# Patient Record
Sex: FEMALE | URBAN - METROPOLITAN AREA
[De-identification: names, ages, dates, MRNs, and addresses within clinical notes are randomized per-mention and may not be internally consistent; named-entity substitution may affect disease eponyms.]

---

## 2022-10-14 ENCOUNTER — HOSPITAL ENCOUNTER (INPATIENT)
Facility: HOSPITAL | Age: 37
LOS: 4 days | Discharge: HOME | DRG: 446 | End: 2022-10-18
Attending: INTERNAL MEDICINE | Admitting: INTERNAL MEDICINE
Payer: MEDICAID

## 2022-10-14 DIAGNOSIS — K80.50 CHOLEDOCHOLITHIASIS: ICD-10-CM

## 2022-10-14 PROCEDURE — 11000001 HC ACUTE MED/SURG PRIVATE ROOM

## 2022-10-14 PROCEDURE — 25000003 PHARM REV CODE 250: Performed by: HOSPITALIST

## 2022-10-14 PROCEDURE — 99223 1ST HOSP IP/OBS HIGH 75: CPT | Mod: ,,, | Performed by: HOSPITALIST

## 2022-10-14 PROCEDURE — 99223 PR INITIAL HOSPITAL CARE,LEVL III: ICD-10-PCS | Mod: ,,, | Performed by: HOSPITALIST

## 2022-10-14 PROCEDURE — 63600175 PHARM REV CODE 636 W HCPCS: Performed by: HOSPITALIST

## 2022-10-14 RX ORDER — ACETAMINOPHEN 325 MG/1
650 TABLET ORAL EVERY 4 HOURS PRN
Status: DISCONTINUED | OUTPATIENT
Start: 2022-10-14 | End: 2022-10-18 | Stop reason: HOSPADM

## 2022-10-14 RX ORDER — TALC
6 POWDER (GRAM) TOPICAL NIGHTLY PRN
Status: DISCONTINUED | OUTPATIENT
Start: 2022-10-14 | End: 2022-10-18 | Stop reason: HOSPADM

## 2022-10-14 RX ORDER — NALOXONE HCL 0.4 MG/ML
0.02 VIAL (ML) INJECTION
Status: DISCONTINUED | OUTPATIENT
Start: 2022-10-14 | End: 2022-10-18 | Stop reason: HOSPADM

## 2022-10-14 RX ORDER — IPRATROPIUM BROMIDE AND ALBUTEROL SULFATE 2.5; .5 MG/3ML; MG/3ML
3 SOLUTION RESPIRATORY (INHALATION) EVERY 6 HOURS PRN
Status: DISCONTINUED | OUTPATIENT
Start: 2022-10-14 | End: 2022-10-18 | Stop reason: HOSPADM

## 2022-10-14 RX ORDER — PROCHLORPERAZINE EDISYLATE 5 MG/ML
5 INJECTION INTRAMUSCULAR; INTRAVENOUS EVERY 6 HOURS PRN
Status: DISCONTINUED | OUTPATIENT
Start: 2022-10-14 | End: 2022-10-18 | Stop reason: HOSPADM

## 2022-10-14 RX ORDER — IBUPROFEN 200 MG
24 TABLET ORAL
Status: DISCONTINUED | OUTPATIENT
Start: 2022-10-14 | End: 2022-10-18 | Stop reason: HOSPADM

## 2022-10-14 RX ORDER — SODIUM CHLORIDE 9 MG/ML
INJECTION, SOLUTION INTRAVENOUS CONTINUOUS
Status: ACTIVE | OUTPATIENT
Start: 2022-10-14 | End: 2022-10-15

## 2022-10-14 RX ORDER — OXYCODONE HYDROCHLORIDE 5 MG/1
5 TABLET ORAL
Status: DISCONTINUED | OUTPATIENT
Start: 2022-10-14 | End: 2022-10-15

## 2022-10-14 RX ORDER — ONDANSETRON 2 MG/ML
4 INJECTION INTRAMUSCULAR; INTRAVENOUS EVERY 8 HOURS PRN
Status: DISCONTINUED | OUTPATIENT
Start: 2022-10-14 | End: 2022-10-18 | Stop reason: HOSPADM

## 2022-10-14 RX ORDER — IBUPROFEN 200 MG
16 TABLET ORAL
Status: DISCONTINUED | OUTPATIENT
Start: 2022-10-14 | End: 2022-10-18 | Stop reason: HOSPADM

## 2022-10-14 RX ORDER — SODIUM CHLORIDE 0.9 % (FLUSH) 0.9 %
10 SYRINGE (ML) INJECTION
Status: DISCONTINUED | OUTPATIENT
Start: 2022-10-14 | End: 2022-10-18 | Stop reason: HOSPADM

## 2022-10-14 RX ADMIN — Medication 6 MG: at 10:10

## 2022-10-14 RX ADMIN — OXYCODONE 5 MG: 5 TABLET ORAL at 10:10

## 2022-10-14 RX ADMIN — ONDANSETRON 4 MG: 2 INJECTION INTRAMUSCULAR; INTRAVENOUS at 08:10

## 2022-10-14 RX ADMIN — ACETAMINOPHEN 650 MG: 325 TABLET ORAL at 08:10

## 2022-10-14 RX ADMIN — SODIUM CHLORIDE: 0.9 INJECTION, SOLUTION INTRAVENOUS at 11:10

## 2022-10-14 NOTE — PROVIDER TRANSFER
Outside Transfer Acceptance Note / Regional Referral Center    Referring facility: [facility]   Referring provider: BLAIR CARVAJAL  Accepting facility: [facility]  Accepting provider: SEBASTIÁN DE ANDA  Reason for transfer: Higher Level of Care   Transfer diagnosis: Choledocholithiasis  Transfer specialty requested: Pancreatic Billiary  Transfer specialty notified: yes  Transfer level: NUMBER 1-5: 2  Bed type requested: med-surg  Isolation status: No active isolations   Admission class or status: IP- Inpatient      Narrative     37-year-old female with a history of prior cholecystectomy, endometriosis, and rectal bleeding presented to [facility] ED on October 14 with pain to the right upper abdomen along with vomiting.  She was previously admitted there on August 16-18 with choledocholithiasis.  She underwent ERCP with balloon sweeps, basket retrievals, and biliary stent placement.  It was noted to be an unsuccessful extraction, and plan was for outpatient follow-up for further attempts to remove the stone.  She presented back to the emergency department on October 14 with worsening abdominal pain, nausea, and vomiting.  Imaging noted choledocholithiasis with prominence of the common bile duct.  ED provider spoke with Gastroenterology there, and they felt the patient needed more advanced biliary services.  She was felt not to have evidence of infection at present.  ED provider noted they are unable to admit there because the patient needs a higher level of care.  Referring provider spoke with Biliary Service at [facility].  Requesting transfer to Hospital Medicine at [facility] for further treatment.    COVID pending  October 14: Urinalysis with small leukocyte esterase, small blood, 10 RBC, 5 WBC, 13 epithelial cells, rare bacteria  Sodium 137, potassium 4.3, glucose 99, chloride 104, CO2 26, BUN 13, creatinine 0.9, calcium 9.7, total bilirubin  0.5, AST 17, ALT 50, lipase 30, white blood cells 8.7, hemoglobin 14, hematocrit 39.2, platelets 382    Abdominal ultrasound noted choledocholithiasis with mild prominence of the common bile duct.  A stent is not definitively visualized within the common bile duct.  CBD measures 7 mm in diameter.  Multiple echogenic foci within the common bile duct with posterior acoustic shadowing consistent with choledocholithiasis.  Largest discrete measures 1.2 cm.    Objective     Vitals:  Temperature 98°, pulse 76, respirations 16, blood pressure 128/76, O2 sats 99% on room air  Allergies: Review of patient's allergies indicates:  Not on File   NPO: No         Instructions    Admit to Hospital Medicine  Consult Biliary Service      Upon patient arrival to floor, please send SecureChat to [facility] or call extension [phone] (if no answer, do NOT leave a callback number after the beep, rather please send a SecureChat to [facility]), for Hospital Medicine admit team assignment and for additional admit orders for the patient.  Do not page the attending physician associated with the patient on arrival (this physician may not be on duty at the time of arrival).  Rather, always send a SecureChat to [facility] or call [phone] to reach the triage physician for orders and team assignment.    KAMRYN Boone MD  Hospital Medicine Staff  Cell: [phone]

## 2022-10-15 PROBLEM — F41.9 ANXIETY: Status: ACTIVE | Noted: 2022-10-15

## 2022-10-15 PROBLEM — Z72.0 TOBACCO ABUSE: Status: ACTIVE | Noted: 2022-10-15

## 2022-10-15 LAB
ALBUMIN SERPL BCP-MCNC: 3.2 G/DL (ref 3.5–5.2)
ALP SERPL-CCNC: 152 U/L (ref 55–135)
ALT SERPL W/O P-5'-P-CCNC: 35 U/L (ref 10–44)
ANION GAP SERPL CALC-SCNC: 7 MMOL/L (ref 8–16)
AST SERPL-CCNC: 13 U/L (ref 10–40)
BASOPHILS # BLD AUTO: 0.03 K/UL (ref 0–0.2)
BASOPHILS NFR BLD: 0.6 % (ref 0–1.9)
BILIRUB SERPL-MCNC: 0.7 MG/DL (ref 0.1–1)
BUN SERPL-MCNC: 13 MG/DL (ref 6–20)
CALCIUM SERPL-MCNC: 8.4 MG/DL (ref 8.7–10.5)
CHLORIDE SERPL-SCNC: 107 MMOL/L (ref 95–110)
CO2 SERPL-SCNC: 24 MMOL/L (ref 23–29)
CREAT SERPL-MCNC: 1 MG/DL (ref 0.5–1.4)
DIFFERENTIAL METHOD: ABNORMAL
EOSINOPHIL # BLD AUTO: 0.2 K/UL (ref 0–0.5)
EOSINOPHIL NFR BLD: 3.3 % (ref 0–8)
ERYTHROCYTE [DISTWIDTH] IN BLOOD BY AUTOMATED COUNT: 11.3 % (ref 11.5–14.5)
EST. GFR  (NO RACE VARIABLE): >60 ML/MIN/1.73 M^2
GLUCOSE SERPL-MCNC: 92 MG/DL (ref 70–110)
HCT VFR BLD AUTO: 35.7 % (ref 37–48.5)
HGB BLD-MCNC: 12.3 G/DL (ref 12–16)
IMM GRANULOCYTES # BLD AUTO: 0.01 K/UL (ref 0–0.04)
IMM GRANULOCYTES NFR BLD AUTO: 0.2 % (ref 0–0.5)
LYMPHOCYTES # BLD AUTO: 2.1 K/UL (ref 1–4.8)
LYMPHOCYTES NFR BLD: 42.2 % (ref 18–48)
MCH RBC QN AUTO: 33.7 PG (ref 27–31)
MCHC RBC AUTO-ENTMCNC: 34.5 G/DL (ref 32–36)
MCV RBC AUTO: 98 FL (ref 82–98)
MONOCYTES # BLD AUTO: 0.4 K/UL (ref 0.3–1)
MONOCYTES NFR BLD: 7.1 % (ref 4–15)
NEUTROPHILS # BLD AUTO: 2.3 K/UL (ref 1.8–7.7)
NEUTROPHILS NFR BLD: 46.6 % (ref 38–73)
NRBC BLD-RTO: 0 /100 WBC
PLATELET # BLD AUTO: 313 K/UL (ref 150–450)
PMV BLD AUTO: 9.9 FL (ref 9.2–12.9)
POTASSIUM SERPL-SCNC: 4 MMOL/L (ref 3.5–5.1)
PROT SERPL-MCNC: 6 G/DL (ref 6–8.4)
RBC # BLD AUTO: 3.65 M/UL (ref 4–5.4)
SODIUM SERPL-SCNC: 138 MMOL/L (ref 136–145)
WBC # BLD AUTO: 4.91 K/UL (ref 3.9–12.7)

## 2022-10-15 PROCEDURE — 85025 COMPLETE CBC W/AUTO DIFF WBC: CPT | Performed by: HOSPITALIST

## 2022-10-15 PROCEDURE — 99223 PR INITIAL HOSPITAL CARE,LEVL III: ICD-10-PCS | Mod: 25,,, | Performed by: INTERNAL MEDICINE

## 2022-10-15 PROCEDURE — 80053 COMPREHEN METABOLIC PANEL: CPT | Performed by: HOSPITALIST

## 2022-10-15 PROCEDURE — 25000003 PHARM REV CODE 250: Performed by: STUDENT IN AN ORGANIZED HEALTH CARE EDUCATION/TRAINING PROGRAM

## 2022-10-15 PROCEDURE — 63600175 PHARM REV CODE 636 W HCPCS: Performed by: STUDENT IN AN ORGANIZED HEALTH CARE EDUCATION/TRAINING PROGRAM

## 2022-10-15 PROCEDURE — 63600175 PHARM REV CODE 636 W HCPCS: Performed by: HOSPITALIST

## 2022-10-15 PROCEDURE — 99232 PR SUBSEQUENT HOSPITAL CARE,LEVL II: ICD-10-PCS | Mod: ,,, | Performed by: STUDENT IN AN ORGANIZED HEALTH CARE EDUCATION/TRAINING PROGRAM

## 2022-10-15 PROCEDURE — S4991 NICOTINE PATCH NONLEGEND: HCPCS | Performed by: STUDENT IN AN ORGANIZED HEALTH CARE EDUCATION/TRAINING PROGRAM

## 2022-10-15 PROCEDURE — 36415 COLL VENOUS BLD VENIPUNCTURE: CPT | Performed by: HOSPITALIST

## 2022-10-15 PROCEDURE — 11000001 HC ACUTE MED/SURG PRIVATE ROOM

## 2022-10-15 PROCEDURE — 25000003 PHARM REV CODE 250: Performed by: HOSPITALIST

## 2022-10-15 PROCEDURE — 99232 SBSQ HOSP IP/OBS MODERATE 35: CPT | Mod: ,,, | Performed by: STUDENT IN AN ORGANIZED HEALTH CARE EDUCATION/TRAINING PROGRAM

## 2022-10-15 PROCEDURE — 99223 1ST HOSP IP/OBS HIGH 75: CPT | Mod: 25,,, | Performed by: INTERNAL MEDICINE

## 2022-10-15 RX ORDER — PANTOPRAZOLE SODIUM 40 MG/1
40 TABLET, DELAYED RELEASE ORAL DAILY
Status: DISCONTINUED | OUTPATIENT
Start: 2022-10-15 | End: 2022-10-18 | Stop reason: HOSPADM

## 2022-10-15 RX ORDER — OXYCODONE HYDROCHLORIDE 5 MG/1
5 TABLET ORAL
Status: DISCONTINUED | OUTPATIENT
Start: 2022-10-15 | End: 2022-10-18 | Stop reason: HOSPADM

## 2022-10-15 RX ORDER — OXYCODONE HYDROCHLORIDE 10 MG/1
10 TABLET ORAL EVERY 6 HOURS PRN
Status: DISCONTINUED | OUTPATIENT
Start: 2022-10-15 | End: 2022-10-18 | Stop reason: HOSPADM

## 2022-10-15 RX ORDER — IBUPROFEN 200 MG
1 TABLET ORAL DAILY
Status: DISCONTINUED | OUTPATIENT
Start: 2022-10-15 | End: 2022-10-18 | Stop reason: HOSPADM

## 2022-10-15 RX ADMIN — OXYCODONE HYDROCHLORIDE 10 MG: 10 TABLET ORAL at 09:10

## 2022-10-15 RX ADMIN — OXYCODONE 5 MG: 5 TABLET ORAL at 11:10

## 2022-10-15 RX ADMIN — OXYCODONE HYDROCHLORIDE 10 MG: 10 TABLET ORAL at 02:10

## 2022-10-15 RX ADMIN — Medication 6 MG: at 09:10

## 2022-10-15 RX ADMIN — PIPERACILLIN SODIUM AND TAZOBACTAM SODIUM 4.5 G: 4; .5 INJECTION, POWDER, LYOPHILIZED, FOR SOLUTION INTRAVENOUS at 02:10

## 2022-10-15 RX ADMIN — OXYCODONE 5 MG: 5 TABLET ORAL at 04:10

## 2022-10-15 RX ADMIN — ONDANSETRON 4 MG: 2 INJECTION INTRAMUSCULAR; INTRAVENOUS at 08:10

## 2022-10-15 RX ADMIN — PIPERACILLIN SODIUM AND TAZOBACTAM SODIUM 4.5 G: 4; .5 INJECTION, POWDER, LYOPHILIZED, FOR SOLUTION INTRAVENOUS at 09:10

## 2022-10-15 RX ADMIN — PANTOPRAZOLE SODIUM 40 MG: 40 TABLET, DELAYED RELEASE ORAL at 02:10

## 2022-10-15 RX ADMIN — OXYCODONE 5 MG: 5 TABLET ORAL at 08:10

## 2022-10-15 RX ADMIN — ONDANSETRON 4 MG: 2 INJECTION INTRAMUSCULAR; INTRAVENOUS at 09:10

## 2022-10-15 RX ADMIN — NICOTINE 1 PATCH: 14 PATCH, EXTENDED RELEASE TRANSDERMAL at 12:10

## 2022-10-15 RX ADMIN — PROCHLORPERAZINE EDISYLATE 5 MG: 5 INJECTION INTRAMUSCULAR; INTRAVENOUS at 01:10

## 2022-10-15 NOTE — HPI
37-year-old female with a history of prior cholecystectomy, endometriosis, and rectal bleeding presented to [facility] ED on October 14 with pain to the right upper abdomen along with vomiting.  She was previously admitted there on August 16-18 with choledocholithiasis.  She underwent ERCP with balloon sweeps, basket retrievals, and biliary stent placement.  It was noted to be an unsuccessful extraction, and plan was for outpatient follow-up for further attempts to remove the stone.  She presented back to the emergency department on October 14 with worsening abdominal pain, nausea, and vomiting.  Imaging noted choledocholithiasis with prominence of the common bile duct.  ED provider spoke with Gastroenterology there, and they felt the patient needed more advanced biliary services.  She was felt not to have evidence of infection at present so abx were not started. Pt. Transferred to [facility] for [facility].     COVID negative  October 14: Urinalysis with small leukocyte esterase, small blood, 10 RBC, 5 WBC, 13 epithelial cells, rare bacteria  Sodium 137, potassium 4.3, glucose 99, chloride 104, CO2 26, BUN 13, creatinine 0.9, calcium 9.7, total bilirubin 0.5, AST 17, ALT 50, lipase 30, white blood cells 8.7, hemoglobin 14, hematocrit 39.2, platelets 382     Abdominal ultrasound noted choledocholithiasis with mild prominence of the common bile duct.  A stent is not definitively visualized within the common bile duct.  CBD measures 7 mm in diameter.  Multiple echogenic foci within the common bile duct with posterior acoustic shadowing consistent with choledocholithiasis.  Largest discrete measures 1.2 cm.

## 2022-10-15 NOTE — CONSULTS
[facility]  Gastroenterology  Consult Note    Patient Name: Charlette Blair  MRN: [id number]  Admission Date: 10/14/2022  Hospital Length of Stay: 1 days  Code Status: Full Code   Attending Provider:  Dr. Freeman  Consulting Provider: Regulo Baez MD  Primary Care Physician: Primary Doctor No  Principal Problem:Choledocholithiasis    Inpatient consult to Advanced Endoscopy Service (AES)  Consult performed by: Regulo Baez MD  Consult ordered by: George Blair MD      Subjective:     HPI: Charlette Blair is a 37 y.o. female with history of prior cholecystectomy & choledocholithiasis (s/p unsuccessful ERCP) presented to the ER off [facility] yesterday with severe right upper quadrant pain, nausea and vomiting.  In the outside ED, labs were significant for AST 17, ALT 50, total bilirubin 0.5, lipase 30 and WBC 8.7.    In August 2022, she presented to the ER with similar complaints.  Ultrasound showed choledocholithiasis.  ERCP was performed with CBD sweep, balloon extraction and baskets to remove the stone but this was not successful.  The patient was subsequently discharged.  Since the procedure, she has been having episodes of nausea, vomiting abdominal pain and presyncope every 2 weeks.    The patient was seen sitting up in bed today,  at bedside.  She reports mild abdominal pain, but no nausea or vomiting today.  Review of vitals reveal that she is hemodynamically stable and afebrile.  She is not on blood thinners and has no history of gastric surgeries.    Endoscopic history:  - ERCP (8/17/22):   1. Papilla appeared endoscopically normal, did not appreciate prior   sphincterotomy, etc. Pancreatic duct was injected. Appeared to have somewhat  of an annular appearance with dye traveling from the major papilla to what   appeared to be minor papilla that was possibly distal. Somewhat of an annular  appearance. Otherwise unremarkable. Indocin suppository was given.  2. Common bile  duct was dilated with a large filling defect.   Sphincterotomy was performed over a guidewire. Multiple attempts to extract   the stone with a 9 x 12 mm balloon was unsuccessful. A large amount of   pigmented stone fragments were retrieved. Multiple baskets were tried to   grasp and pull the stone out, all unsuccessful. A 10-[place] 5 cm stent was   placed with drainage proximal to the stone. The patient tolerated the   procedure well.    No past medical history on file.    No past surgical history on file.    No family history on file.    Social History     Socioeconomic History    Marital status: Unknown       No current facility-administered medications on file prior to encounter.     No current outpatient medications on file prior to encounter.       Review of patient's allergies indicates:  Not on File    Review of Systems   Constitutional:  Negative for chills, fever and weight loss.   HENT:  Negative for congestion and hearing loss.    Eyes:  Negative for blurred vision and discharge.   Respiratory:  Negative for cough and hemoptysis.    Cardiovascular:  Negative for chest pain and leg swelling.   Gastrointestinal:  Positive for abdominal pain, nausea and vomiting. Negative for blood in stool.   Genitourinary:  Negative for dysuria and hematuria.   Musculoskeletal:  Negative for myalgias and neck pain.   Neurological:  Negative for sensory change and focal weakness.   Psychiatric/Behavioral:  Negative for hallucinations and suicidal ideas.       Objective:     Vitals:    10/15/22 0816   BP:    Pulse:    Resp: 16   Temp:      Physical Exam  Vitals reviewed.   Constitutional:       General: She is not in acute distress.     Appearance: She is well-developed.   HENT:      Head: Normocephalic and atraumatic.   Eyes:      Extraocular Movements: Extraocular movements intact.      Pupils: Pupils are equal, round, and reactive to light.   Neck:      Vascular: No JVD.      Trachea: No tracheal deviation.    Cardiovascular:      Rate and Rhythm: Normal rate and regular rhythm.      Heart sounds: No murmur heard.    No friction rub. No gallop.   Pulmonary:      Effort: No respiratory distress.      Breath sounds: Normal breath sounds. No wheezing or rales.   Abdominal:      General: Bowel sounds are normal. There is no distension.      Palpations: Abdomen is soft. There is no mass.      Tenderness: There is abdominal tenderness.      Comments: RUQ and epigastric tenderness, negative Diego's sign, no guarding   Musculoskeletal:         General: No deformity.      Cervical back: Neck supple.   Lymphadenopathy:      Cervical: No cervical adenopathy.   Skin:     General: Skin is warm and dry.      Findings: No rash.   Neurological:      Mental Status: She is alert and oriented to person, place, and time.     Significant Labs:  Recent Labs   Lab 10/15/22  0655   HGB 12.3       Lab Results   Component Value Date    WBC 4.91 10/15/2022    HGB 12.3 10/15/2022    HCT 35.7 (L) 10/15/2022    MCV 98 10/15/2022     10/15/2022       Lab Results   Component Value Date     10/15/2022    K 4.0 10/15/2022     10/15/2022    CO2 24 10/15/2022    BUN 13 10/15/2022    CREATININE 1.0 10/15/2022    CALCIUM 8.4 (L) 10/15/2022    ANIONGAP 7 (L) 10/15/2022    ESTGFRAFRICA 71 (L) 02/02/2020    EGFRNONAA 61 (L) 02/02/2020       Lab Results   Component Value Date    ALT 35 10/15/2022    AST 13 10/15/2022    ALKPHOS 152 (H) 10/15/2022    BILITOT 0.7 10/15/2022       No results found for: INR    Significant Imaging:  Reviewed pertinent radiology findings.       Assessment/Plan:     Charlette Blair is a 37 y.o. female with history of prior cholecystectomy & choledocholithiasis (s/p unsuccessful ERCP) presented to the ER off [facility] yesterday with severe right upper quadrant pain, nausea and vomiting.  She is transferred here for advanced endoscopy evaluation.    Problem List:  Choledocholithiasis  History of unsuccessful  ERCP for choledocholithiasis in August 2022  History of cholecystectomy 11 years ago  Hx of PUD    - In the outside ED, labs were significant for AST 17, ALT 50, total bilirubin 0.5, lipase 30 and WBC 8.7.    - ERCP (8/17/22):   1. Papilla appeared endoscopically normal, did not appreciate prior   sphincterotomy, etc. Pancreatic duct was injected. Appeared to have somewhat  of an annular appearance with dye traveling from the major papilla to what   appeared to be minor papilla that was possibly distal. Somewhat of an annular  appearance. Otherwise unremarkable. Indocin suppository was given.  2. Common bile duct was dilated with a large filling defect.   Sphincterotomy was performed over a guidewire. Multiple attempts to extract   the stone with a 9 x 12 mm balloon was unsuccessful. A large amount of   pigmented stone fragments were retrieved. Multiple baskets were tried to   grasp and pull the stone out, all unsuccessful. A 10-[place]ian 5 cm stent was   placed with drainage proximal to the stone. The patient tolerated the   procedure well.    Recommendations:  - plan on ERCP on Monday.  Make patient NPO midnight before procedure.  -in the interim, diet per primary team.  -daily CBC and CMP  -start IV Zosyn    Thank you for involving us in the care of Charlette Blair. Please call with any additional questions, concerns or changes in the patient's clinical status. We will continue to follow.     Regulo Baez MD  Gastroenterology Fellow PGY IV  [facility]

## 2022-10-15 NOTE — ASSESSMENT & PLAN NOTE
-Pt. With recurrent RUQ pain and nausea after recent biliary stent placement  -U/S at outside hospital noted Choledocholithiasis with mild prominence of the common bile duct. ALT and alk phos mildly elevated, Tbili WNL  -Pt. Afebrile with normal WBC., no indication for abx  -AES consulted for further assistance, will leave patient NPO @ [facility] in anticipation of need for repeat abdominal imaging or intervention

## 2022-10-15 NOTE — PROGRESS NOTES
Nurses Note -- 4 Eyes      10/14/2022   7:07 PM      Skin assessed during: Admit      [x] No Pressure Injuries Present    []Prevention Measures Documented      [] Yes- Altered Skin Integrity Present or Discovered   [] LDA Added if Not in Epic (Describe Wound)   [] New Altered Skin Integrity was Present on Admit and Documented in LDA   [] Wound Image Taken    Wound Care Consulted? No    Attending Nurse:  Krysten Michelle RN     Second RN/Staff Member:  Monroe Daniel

## 2022-10-15 NOTE — SUBJECTIVE & OBJECTIVE
Interval History:   No events overnight. Patient with continued abdominal pain and nausea. States she has an appetitive. No other complaints.       Review of Systems   Constitutional:  Negative for activity change, appetite change, chills, fever and unexpected weight change.   HENT:  Negative for congestion and sore throat.    Respiratory:  Negative for cough and shortness of breath.    Cardiovascular:  Negative for chest pain, palpitations and leg swelling.   Gastrointestinal:  Positive for abdominal pain and nausea. Negative for abdominal distention, blood in stool, constipation, diarrhea and vomiting.   Genitourinary:  Negative for difficulty urinating, dysuria and hematuria.   Musculoskeletal:  Negative for arthralgias and myalgias.   Skin:  Negative for color change and rash.   Neurological:  Negative for dizziness, tremors and seizures.   Objective:     Vital Signs (Most Recent):  Temp: 97.3 °F (36.3 °C) (10/15/22 1144)  Pulse: 64 (10/15/22 1144)  Resp: 18 (10/15/22 1140)  BP: 125/67 (10/15/22 1144)  SpO2: 100 % (10/15/22 1144) Vital Signs (24h Range):  Temp:  [97.3 °F (36.3 °C)-98.2 °F (36.8 °C)] 97.3 °F (36.3 °C)  Pulse:  [52-64] 64  Resp:  [16-18] 18  SpO2:  [95 %-100 %] 100 %  BP: (123-143)/(58-74) 125/67        There is no height or weight on file to calculate BMI.  No intake or output data in the 24 hours ending 10/15/22 1345   Physical Exam  Vitals reviewed.   Constitutional:       General: She is not in acute distress.     Appearance: She is well-developed.   HENT:      Head: Normocephalic and atraumatic.   Eyes:      Extraocular Movements: Extraocular movements intact.      Pupils: Pupils are equal, round, and reactive to light.   Neck:      Vascular: No JVD.      Trachea: No tracheal deviation.   Cardiovascular:      Rate and Rhythm: Normal rate and regular rhythm.      Heart sounds: No murmur heard.    No friction rub. No gallop.   Pulmonary:      Effort: No respiratory distress.      Breath sounds:  Normal breath sounds. No wheezing or rales.   Abdominal:      General: Bowel sounds are normal. There is no distension.      Palpations: Abdomen is soft. There is no mass.      Tenderness: There is abdominal tenderness (RUQ, epigastric).      Comments: Negative Diego's sign, no guarding   Musculoskeletal:         General: No deformity.      Cervical back: Neck supple.   Lymphadenopathy:      Cervical: No cervical adenopathy.   Skin:     General: Skin is warm and dry.      Findings: No rash.   Neurological:      Mental Status: She is alert and oriented to person, place, and time.       Significant Labs: CBC:   Recent Labs   Lab 10/15/22  0655   WBC 4.91   HGB 12.3   HCT 35.7*        CMP:   Recent Labs   Lab 10/15/22  0655      K 4.0      CO2 24   GLU 92   BUN 13   CREATININE 1.0   CALCIUM 8.4*   PROT 6.0   ALBUMIN 3.2*   BILITOT 0.7   ALKPHOS 152*   AST 13   ALT 35   ANIONGAP 7*       Significant Imaging: I have reviewed all pertinent imaging results/findings within the past 24 hours.

## 2022-10-15 NOTE — SUBJECTIVE & OBJECTIVE
No past medical history    Past surgical history  biliary stent placement  Cholecystectomy  Tubal ligation    Review of patient's allergies indicates:  Not on File    No current facility-administered medications on file prior to encounter.     No current outpatient medications on file prior to encounter.     Family History    No reported relevant family history       Tobacco Use    Smoking status: Not on file    Smokeless tobacco: Not on file   Substance and Sexual Activity    Alcohol use: Not on file    Drug use: Not on file    Sexual activity: Not on file     Review of Systems   Constitutional:  Negative for activity change, appetite change, chills, fever and unexpected weight change.   HENT:  Negative for congestion and sore throat.    Respiratory:  Negative for cough and shortness of breath.    Cardiovascular:  Negative for chest pain, palpitations and leg swelling.   Gastrointestinal:  Positive for abdominal pain and nausea. Negative for abdominal distention, blood in stool, constipation, diarrhea and vomiting.   Genitourinary:  Negative for difficulty urinating, dysuria and hematuria.   Musculoskeletal:  Negative for arthralgias and myalgias.   Skin:  Negative for color change and rash.   Neurological:  Negative for dizziness, tremors and seizures.   Objective:     Vital Signs (Most Recent):  Temp: 98.2 °F (36.8 °C) (10/14/22 2034)  Pulse: (!) 56 (10/14/22 2034)  Resp: 16 (10/14/22 2034)  BP: (!) 143/70 (10/14/22 2034)  SpO2: 97 % (10/14/22 2034)   Vital Signs (24h Range):  Temp:  [97.4 °F (36.3 °C)-98.2 °F (36.8 °C)] 98.2 °F (36.8 °C)  Pulse:  [52-56] 56  Resp:  [16-18] 16  SpO2:  [95 %-99 %] 97 %  BP: (120-143)/(70-83) 143/70        There is no height or weight on file to calculate BMI.    Physical Exam  Vitals reviewed.   Constitutional:       General: She is not in acute distress.     Appearance: She is well-developed.   HENT:      Head: Normocephalic and atraumatic.   Eyes:      Extraocular Movements:  Extraocular movements intact.      Pupils: Pupils are equal, round, and reactive to light.   Neck:      Vascular: No JVD.      Trachea: No tracheal deviation.   Cardiovascular:      Rate and Rhythm: Normal rate and regular rhythm.      Heart sounds: No murmur heard.    No friction rub. No gallop.   Pulmonary:      Effort: No respiratory distress.      Breath sounds: Normal breath sounds. No wheezing or rales.   Abdominal:      General: Bowel sounds are normal. There is no distension.      Palpations: Abdomen is soft. There is no mass.      Tenderness: There is abdominal tenderness.      Comments: RUQ and epigastric tenderness, negative Diego's sign, no guarding   Musculoskeletal:         General: No deformity.      Cervical back: Neck supple.   Lymphadenopathy:      Cervical: No cervical adenopathy.   Skin:     General: Skin is warm and dry.      Findings: No rash.   Neurological:      Mental Status: She is alert and oriented to person, place, and time.         CRANIAL NERVES     CN III, IV, VI   Pupils are equal, round, and reactive to light.     Significant Labs: All pertinent labs within the past 24 hours have been reviewed.    Significant Imaging: I have reviewed all pertinent imaging results/findings within the past 24 hours.

## 2022-10-15 NOTE — H&P
[facility] Medicine  History & Physical    Patient Name: Charlette Blair  MRN: [id number]  Patient Class: IP- Inpatient  Admission Date: 10/14/2022  Attending Physician: Doron Ibarra MD   Primary Care Provider: Primary Doctor No         Patient information was obtained from patient, past medical records and ER records.     Subjective:     Principal Problem:Choledocholithiasis    Chief Complaint: abdominal pain       HPI: 37-year-old female with a history of prior cholecystectomy, endometriosis, and rectal bleeding presented to [facility] ED on October 14 with pain to the right upper abdomen along with vomiting.  She was previously admitted there on August 16-18 with choledocholithiasis.  She underwent ERCP with balloon sweeps, basket retrievals, and biliary stent placement.  It was noted to be an unsuccessful extraction, and plan was for outpatient follow-up for further attempts to remove the stone.  She presented back to the emergency department on October 14 with worsening abdominal pain, nausea, and vomiting.  Imaging noted choledocholithiasis with prominence of the common bile duct.  ED provider spoke with Gastroenterology there, and they felt the patient needed more advanced biliary services.  She was felt not to have evidence of infection at present so abx were not started. Pt. Transferred to [facility] for AES.     COVID negative  October 14: Urinalysis with small leukocyte esterase, small blood, 10 RBC, 5 WBC, 13 epithelial cells, rare bacteria  Sodium 137, potassium 4.3, glucose 99, chloride 104, CO2 26, BUN 13, creatinine 0.9, calcium 9.7, total bilirubin 0.5, AST 17, ALT 50, lipase 30, white blood cells 8.7, hemoglobin 14, hematocrit 39.2, platelets 382     Abdominal ultrasound noted choledocholithiasis with mild prominence of the common bile duct.  A stent is not definitively visualized within the common bile duct.  CBD measures 7 mm in diameter.  Multiple  echogenic foci within the common bile duct with posterior acoustic shadowing consistent with choledocholithiasis.  Largest discrete measures 1.2 cm.         No past medical history    Past surgical history  biliary stent placement  Cholecystectomy  Tubal ligation    Review of patient's allergies indicates:  Not on File    No current facility-administered medications on file prior to encounter.     No current outpatient medications on file prior to encounter.     Family History    No reported relevant family history       Tobacco Use    Smoking status: Not on file    Smokeless tobacco: Not on file   Substance and Sexual Activity    Alcohol use: Not on file    Drug use: Not on file    Sexual activity: Not on file     Review of Systems   Constitutional:  Negative for activity change, appetite change, chills, fever and unexpected weight change.   HENT:  Negative for congestion and sore throat.    Respiratory:  Negative for cough and shortness of breath.    Cardiovascular:  Negative for chest pain, palpitations and leg swelling.   Gastrointestinal:  Positive for abdominal pain and nausea. Negative for abdominal distention, blood in stool, constipation, diarrhea and vomiting.   Genitourinary:  Negative for difficulty urinating, dysuria and hematuria.   Musculoskeletal:  Negative for arthralgias and myalgias.   Skin:  Negative for color change and rash.   Neurological:  Negative for dizziness, tremors and seizures.   Objective:     Vital Signs (Most Recent):  Temp: 98.2 °F (36.8 °C) (10/14/22 2034)  Pulse: (!) 56 (10/14/22 2034)  Resp: 16 (10/14/22 2034)  BP: (!) 143/70 (10/14/22 2034)  SpO2: 97 % (10/14/22 2034)   Vital Signs (24h Range):  Temp:  [97.4 °F (36.3 °C)-98.2 °F (36.8 °C)] 98.2 °F (36.8 °C)  Pulse:  [52-56] 56  Resp:  [16-18] 16  SpO2:  [95 %-99 %] 97 %  BP: (120-143)/(70-83) 143/70        There is no height or weight on file to calculate BMI.    Physical Exam  Vitals reviewed.   Constitutional:        General: She is not in acute distress.     Appearance: She is well-developed.   HENT:      Head: Normocephalic and atraumatic.   Eyes:      Extraocular Movements: Extraocular movements intact.      Pupils: Pupils are equal, round, and reactive to light.   Neck:      Vascular: No JVD.      Trachea: No tracheal deviation.   Cardiovascular:      Rate and Rhythm: Normal rate and regular rhythm.      Heart sounds: No murmur heard.    No friction rub. No gallop.   Pulmonary:      Effort: No respiratory distress.      Breath sounds: Normal breath sounds. No wheezing or rales.   Abdominal:      General: Bowel sounds are normal. There is no distension.      Palpations: Abdomen is soft. There is no mass.      Tenderness: There is abdominal tenderness.      Comments: RUQ and epigastric tenderness, negative Diego's sign, no guarding   Musculoskeletal:         General: No deformity.      Cervical back: Neck supple.   Lymphadenopathy:      Cervical: No cervical adenopathy.   Skin:     General: Skin is warm and dry.      Findings: No rash.   Neurological:      Mental Status: She is alert and oriented to person, place, and time.         CRANIAL NERVES     CN III, IV, VI   Pupils are equal, round, and reactive to light.     Significant Labs: All pertinent labs within the past 24 hours have been reviewed.    Significant Imaging: I have reviewed all pertinent imaging results/findings within the past 24 hours.    Assessment/Plan:     Choledocholithiasis  -Pt. With recurrent RUQ pain and nausea after recent biliary stent placement  -U/S at outside hospital noted Choledocholithiasis with mild prominence of the common bile duct. ALT and alk phos mildly elevated, Tbili WNL  -Pt. Afebrile with normal WBC., no indication for abx  -AES consulted for further assistance, will leave patient NPO @ [facility] in anticipation of need for repeat abdominal imaging or intervention          VTE Risk Mitigation (From admission, onward)         Ordered     IP  VTE LOW RISK PATIENT  Once         10/14/22 2020     Place sequential compression device  Until discontinued         10/14/22 2020                   George Blair MD  Department of Hospital Medicine   [facility] - Surgery

## 2022-10-15 NOTE — PROGRESS NOTES
[facility] Medicine  Progress Note    Patient Name: Charlette Blair  MRN: [id number]  Patient Class: IP- Inpatient   Admission Date: 10/14/2022  Length of Stay: 1 days  Attending Physician: Doron Ibarra MD  Primary Care Provider: Primary Doctor No        Subjective:     Principal Problem:Choledocholithiasis        HPI:  37-year-old female with a history of prior cholecystectomy, endometriosis, and rectal bleeding presented to [facility] ED on October 14 with pain to the right upper abdomen along with vomiting.  She was previously admitted there on August 16-18 with choledocholithiasis.  She underwent ERCP with balloon sweeps, basket retrievals, and biliary stent placement.  It was noted to be an unsuccessful extraction, and plan was for outpatient follow-up for further attempts to remove the stone.  She presented back to the emergency department on October 14 with worsening abdominal pain, nausea, and vomiting.  Imaging noted choledocholithiasis with prominence of the common bile duct.  ED provider spoke with Gastroenterology there, and they felt the patient needed more advanced biliary services.  She was felt not to have evidence of infection at present so abx were not started. Pt. Transferred to [facility] for AES.     COVID negative  October 14: Urinalysis with small leukocyte esterase, small blood, 10 RBC, 5 WBC, 13 epithelial cells, rare bacteria  Sodium 137, potassium 4.3, glucose 99, chloride 104, CO2 26, BUN 13, creatinine 0.9, calcium 9.7, total bilirubin 0.5, AST 17, ALT 50, lipase 30, white blood cells 8.7, hemoglobin 14, hematocrit 39.2, platelets 382     Abdominal ultrasound noted choledocholithiasis with mild prominence of the common bile duct.  A stent is not definitively visualized within the common bile duct.  CBD measures 7 mm in diameter.  Multiple echogenic foci within the common bile duct with posterior acoustic shadowing consistent with  choledocholithiasis.  Largest discrete measures 1.2 cm.         Overview/Hospital Course:  No notes on file    Interval History:   No events overnight. Patient with continued abdominal pain and nausea. States she has an appetitive. No other complaints.       Review of Systems   Constitutional:  Negative for activity change, appetite change, chills, fever and unexpected weight change.   HENT:  Negative for congestion and sore throat.    Respiratory:  Negative for cough and shortness of breath.    Cardiovascular:  Negative for chest pain, palpitations and leg swelling.   Gastrointestinal:  Positive for abdominal pain and nausea. Negative for abdominal distention, blood in stool, constipation, diarrhea and vomiting.   Genitourinary:  Negative for difficulty urinating, dysuria and hematuria.   Musculoskeletal:  Negative for arthralgias and myalgias.   Skin:  Negative for color change and rash.   Neurological:  Negative for dizziness, tremors and seizures.   Objective:     Vital Signs (Most Recent):  Temp: 97.3 °F (36.3 °C) (10/15/22 1144)  Pulse: 64 (10/15/22 1144)  Resp: 18 (10/15/22 1140)  BP: 125/67 (10/15/22 1144)  SpO2: 100 % (10/15/22 1144) Vital Signs (24h Range):  Temp:  [97.3 °F (36.3 °C)-98.2 °F (36.8 °C)] 97.3 °F (36.3 °C)  Pulse:  [52-64] 64  Resp:  [16-18] 18  SpO2:  [95 %-100 %] 100 %  BP: (123-143)/(58-74) 125/67        There is no height or weight on file to calculate BMI.  No intake or output data in the 24 hours ending 10/15/22 1345   Physical Exam  Vitals reviewed.   Constitutional:       General: She is not in acute distress.     Appearance: She is well-developed.   HENT:      Head: Normocephalic and atraumatic.   Eyes:      Extraocular Movements: Extraocular movements intact.      Pupils: Pupils are equal, round, and reactive to light.   Neck:      Vascular: No JVD.      Trachea: No tracheal deviation.   Cardiovascular:      Rate and Rhythm: Normal rate and regular rhythm.      Heart sounds: No  murmur heard.    No friction rub. No gallop.   Pulmonary:      Effort: No respiratory distress.      Breath sounds: Normal breath sounds. No wheezing or rales.   Abdominal:      General: Bowel sounds are normal. There is no distension.      Palpations: Abdomen is soft. There is no mass.      Tenderness: There is abdominal tenderness (RUQ, epigastric).      Comments: Negative Diego's sign, no guarding   Musculoskeletal:         General: No deformity.      Cervical back: Neck supple.   Lymphadenopathy:      Cervical: No cervical adenopathy.   Skin:     General: Skin is warm and dry.      Findings: No rash.   Neurological:      Mental Status: She is alert and oriented to person, place, and time.       Significant Labs: CBC:   Recent Labs   Lab 10/15/22  0655   WBC 4.91   HGB 12.3   HCT 35.7*        CMP:   Recent Labs   Lab 10/15/22  0655      K 4.0      CO2 24   GLU 92   BUN 13   CREATININE 1.0   CALCIUM 8.4*   PROT 6.0   ALBUMIN 3.2*   BILITOT 0.7   ALKPHOS 152*   AST 13   ALT 35   ANIONGAP 7*       Significant Imaging: I have reviewed all pertinent imaging results/findings within the past 24 hours.      Assessment/Plan:      * Choledocholithiasis  - Recurrent RUQ pain and nausea after recent biliary stent placement  - U/S at outside hospital noted Choledocholithiasis with mild prominence of the common bile duct. ALT and alk phos mildly elevated, Tbili WNL  - AES consulted   -- Started Zosyn  -- Plan for endoscopy on 10/17  -- Diet as tolerated  - Anti emetics   - PRN pain control    Anxiety  - Uses MJ at home  - PRN atarax     Tobacco abuse  - Smokes 1/2 to 3/4 ppd  - Nicotine patch daily        VTE Risk Mitigation (From admission, onward)         Ordered     IP VTE LOW RISK PATIENT  Once         10/14/22 2020     Place sequential compression device  Until discontinued         10/14/22 2020                Discharge Planning   ELBA:      Code Status: Full Code   Is the patient medically ready for  discharge?: No    Reason for patient still in hospital (select all that apply): Patient trending condition, Laboratory test, Treatment, Consult recommendations and Pending disposition                     Doron Ibarra MD  Department of [facility] Medicine   [facility]

## 2022-10-15 NOTE — ASSESSMENT & PLAN NOTE
- Recurrent RUQ pain and nausea after recent biliary stent placement  - U/S at outside hospital noted Choledocholithiasis with mild prominence of the common bile duct. ALT and alk phos mildly elevated, Tbili WNL  - AES consulted   -- Started Zosyn  -- Plan for endoscopy on 10/17  -- Diet as tolerated  - Anti emetics   - PRN pain control

## 2022-10-16 LAB
ALBUMIN SERPL BCP-MCNC: 3 G/DL (ref 3.5–5.2)
ALP SERPL-CCNC: 125 U/L (ref 55–135)
ALT SERPL W/O P-5'-P-CCNC: 25 U/L (ref 10–44)
ANION GAP SERPL CALC-SCNC: 7 MMOL/L (ref 8–16)
AST SERPL-CCNC: 12 U/L (ref 10–40)
BASOPHILS # BLD AUTO: 0.03 K/UL (ref 0–0.2)
BASOPHILS NFR BLD: 0.5 % (ref 0–1.9)
BILIRUB SERPL-MCNC: 0.7 MG/DL (ref 0.1–1)
BUN SERPL-MCNC: 9 MG/DL (ref 6–20)
CALCIUM SERPL-MCNC: 8.6 MG/DL (ref 8.7–10.5)
CHLORIDE SERPL-SCNC: 106 MMOL/L (ref 95–110)
CO2 SERPL-SCNC: 23 MMOL/L (ref 23–29)
CREAT SERPL-MCNC: 1.2 MG/DL (ref 0.5–1.4)
DIFFERENTIAL METHOD: ABNORMAL
EOSINOPHIL # BLD AUTO: 0.2 K/UL (ref 0–0.5)
EOSINOPHIL NFR BLD: 3.7 % (ref 0–8)
ERYTHROCYTE [DISTWIDTH] IN BLOOD BY AUTOMATED COUNT: 11 % (ref 11.5–14.5)
EST. GFR  (NO RACE VARIABLE): 59.4 ML/MIN/1.73 M^2
GLUCOSE SERPL-MCNC: 84 MG/DL (ref 70–110)
HCT VFR BLD AUTO: 32.8 % (ref 37–48.5)
HGB BLD-MCNC: 11.2 G/DL (ref 12–16)
IMM GRANULOCYTES # BLD AUTO: 0.01 K/UL (ref 0–0.04)
IMM GRANULOCYTES NFR BLD AUTO: 0.2 % (ref 0–0.5)
LYMPHOCYTES # BLD AUTO: 2.4 K/UL (ref 1–4.8)
LYMPHOCYTES NFR BLD: 39 % (ref 18–48)
MCH RBC QN AUTO: 33.6 PG (ref 27–31)
MCHC RBC AUTO-ENTMCNC: 34.1 G/DL (ref 32–36)
MCV RBC AUTO: 99 FL (ref 82–98)
MONOCYTES # BLD AUTO: 0.3 K/UL (ref 0.3–1)
MONOCYTES NFR BLD: 5.3 % (ref 4–15)
NEUTROPHILS # BLD AUTO: 3.2 K/UL (ref 1.8–7.7)
NEUTROPHILS NFR BLD: 51.3 % (ref 38–73)
NRBC BLD-RTO: 0 /100 WBC
PLATELET # BLD AUTO: 303 K/UL (ref 150–450)
PMV BLD AUTO: 10.2 FL (ref 9.2–12.9)
POTASSIUM SERPL-SCNC: 4 MMOL/L (ref 3.5–5.1)
PROT SERPL-MCNC: 5.5 G/DL (ref 6–8.4)
RBC # BLD AUTO: 3.33 M/UL (ref 4–5.4)
SODIUM SERPL-SCNC: 136 MMOL/L (ref 136–145)
WBC # BLD AUTO: 6.18 K/UL (ref 3.9–12.7)

## 2022-10-16 PROCEDURE — 11000001 HC ACUTE MED/SURG PRIVATE ROOM

## 2022-10-16 PROCEDURE — S4991 NICOTINE PATCH NONLEGEND: HCPCS | Performed by: STUDENT IN AN ORGANIZED HEALTH CARE EDUCATION/TRAINING PROGRAM

## 2022-10-16 PROCEDURE — 25000003 PHARM REV CODE 250: Performed by: STUDENT IN AN ORGANIZED HEALTH CARE EDUCATION/TRAINING PROGRAM

## 2022-10-16 PROCEDURE — 36415 COLL VENOUS BLD VENIPUNCTURE: CPT | Performed by: STUDENT IN AN ORGANIZED HEALTH CARE EDUCATION/TRAINING PROGRAM

## 2022-10-16 PROCEDURE — 85025 COMPLETE CBC W/AUTO DIFF WBC: CPT | Performed by: STUDENT IN AN ORGANIZED HEALTH CARE EDUCATION/TRAINING PROGRAM

## 2022-10-16 PROCEDURE — 25000003 PHARM REV CODE 250: Performed by: HOSPITALIST

## 2022-10-16 PROCEDURE — 99232 PR SUBSEQUENT HOSPITAL CARE,LEVL II: ICD-10-PCS | Mod: ,,, | Performed by: STUDENT IN AN ORGANIZED HEALTH CARE EDUCATION/TRAINING PROGRAM

## 2022-10-16 PROCEDURE — 63600175 PHARM REV CODE 636 W HCPCS: Performed by: STUDENT IN AN ORGANIZED HEALTH CARE EDUCATION/TRAINING PROGRAM

## 2022-10-16 PROCEDURE — 99232 SBSQ HOSP IP/OBS MODERATE 35: CPT | Mod: ,,, | Performed by: STUDENT IN AN ORGANIZED HEALTH CARE EDUCATION/TRAINING PROGRAM

## 2022-10-16 PROCEDURE — 80053 COMPREHEN METABOLIC PANEL: CPT | Performed by: STUDENT IN AN ORGANIZED HEALTH CARE EDUCATION/TRAINING PROGRAM

## 2022-10-16 PROCEDURE — 63600175 PHARM REV CODE 636 W HCPCS: Performed by: HOSPITALIST

## 2022-10-16 RX ORDER — HYDROXYZINE HYDROCHLORIDE 25 MG/1
25 TABLET, FILM COATED ORAL 3 TIMES DAILY PRN
Status: DISCONTINUED | OUTPATIENT
Start: 2022-10-16 | End: 2022-10-18 | Stop reason: HOSPADM

## 2022-10-16 RX ADMIN — NICOTINE 1 PATCH: 14 PATCH, EXTENDED RELEASE TRANSDERMAL at 08:10

## 2022-10-16 RX ADMIN — HYDROXYZINE HYDROCHLORIDE 25 MG: 25 TABLET, FILM COATED ORAL at 08:10

## 2022-10-16 RX ADMIN — OXYCODONE HYDROCHLORIDE 10 MG: 10 TABLET ORAL at 05:10

## 2022-10-16 RX ADMIN — ONDANSETRON 4 MG: 2 INJECTION INTRAMUSCULAR; INTRAVENOUS at 11:10

## 2022-10-16 RX ADMIN — Medication 6 MG: at 08:10

## 2022-10-16 RX ADMIN — PANTOPRAZOLE SODIUM 40 MG: 40 TABLET, DELAYED RELEASE ORAL at 08:10

## 2022-10-16 RX ADMIN — ONDANSETRON 4 MG: 2 INJECTION INTRAMUSCULAR; INTRAVENOUS at 08:10

## 2022-10-16 RX ADMIN — PIPERACILLIN SODIUM AND TAZOBACTAM SODIUM 4.5 G: 4; .5 INJECTION, POWDER, LYOPHILIZED, FOR SOLUTION INTRAVENOUS at 03:10

## 2022-10-16 RX ADMIN — PIPERACILLIN SODIUM AND TAZOBACTAM SODIUM 4.5 G: 4; .5 INJECTION, POWDER, LYOPHILIZED, FOR SOLUTION INTRAVENOUS at 07:10

## 2022-10-16 RX ADMIN — OXYCODONE HYDROCHLORIDE 10 MG: 10 TABLET ORAL at 08:10

## 2022-10-16 RX ADMIN — PIPERACILLIN SODIUM AND TAZOBACTAM SODIUM 4.5 G: 4; .5 INJECTION, POWDER, LYOPHILIZED, FOR SOLUTION INTRAVENOUS at 11:10

## 2022-10-16 RX ADMIN — PROCHLORPERAZINE EDISYLATE 5 MG: 5 INJECTION INTRAMUSCULAR; INTRAVENOUS at 05:10

## 2022-10-16 RX ADMIN — OXYCODONE HYDROCHLORIDE 10 MG: 10 TABLET ORAL at 11:10

## 2022-10-16 NOTE — ASSESSMENT & PLAN NOTE
- Recurrent RUQ pain and nausea after recent biliary stent placement  - U/S at outside hospital noted Choledocholithiasis with mild prominence of the common bile duct. ALT and alk phos mildly elevated, Tbili WNL  - AES consulted   -- Continue Zosyn  -- Plan for endoscopy on 10/17  -- Diet as tolerated  - Anti emetics   - PRN pain control

## 2022-10-16 NOTE — PLAN OF CARE
Problem: Adult Inpatient Plan of Care  Goal: Plan of Care Review  Outcome: Ongoing, Progressing  Flowsheets (Taken 10/15/2022 2158)  Plan of Care Reviewed With: patient     Problem: Skin Injury Risk Increased  Goal: Skin Health and Integrity  Outcome: Ongoing, Progressing  Intervention: Optimize Skin Protection  Flowsheets (Taken 10/15/2022 2158)  Pressure Reduction Techniques: frequent weight shift encouraged  Pressure Reduction Devices: positioning supports utilized  Head of Bed (HOB) Positioning: HOB elevated

## 2022-10-16 NOTE — SUBJECTIVE & OBJECTIVE
Interval History:   No events overnight. Patient tolerating diet. Continues to have post prandial pain with some nausea. Denies fevers, chills, chest pain, and emesis.      Review of Systems   Constitutional:  Negative for activity change, appetite change, chills, fever and unexpected weight change.   HENT:  Negative for congestion and sore throat.    Respiratory:  Negative for cough and shortness of breath.    Cardiovascular:  Negative for chest pain, palpitations and leg swelling.   Gastrointestinal:  Positive for abdominal pain and nausea. Negative for abdominal distention, blood in stool, constipation, diarrhea and vomiting.   Genitourinary:  Negative for difficulty urinating, dysuria and hematuria.   Musculoskeletal:  Negative for arthralgias and myalgias.   Skin:  Negative for color change and rash.   Neurological:  Negative for dizziness, tremors and seizures.   Objective:     Vital Signs (Most Recent):  Temp: 97.9 °F (36.6 °C) (10/16/22 1126)  Pulse: (!) 58 (10/16/22 1126)  Resp: 18 (10/16/22 1151)  BP: (!) 156/70 (10/16/22 1126)  SpO2: 100 % (10/16/22 1126)   Vital Signs (24h Range):  Temp:  [96.7 °F (35.9 °C)-97.9 °F (36.6 °C)] 97.9 °F (36.6 °C)  Pulse:  [52-67] 58  Resp:  [18] 18  SpO2:  [96 %-100 %] 100 %  BP: (118-156)/(60-83) 156/70     Weight: 50.1 kg (110 lb 7.2 oz)  There is no height or weight on file to calculate BMI.  No intake or output data in the 24 hours ending 10/16/22 1403   Physical Exam  Vitals reviewed.   Constitutional:       General: She is not in acute distress.     Appearance: She is well-developed.   HENT:      Head: Normocephalic and atraumatic.   Eyes:      Extraocular Movements: Extraocular movements intact.      Pupils: Pupils are equal, round, and reactive to light.   Neck:      Vascular: No JVD.      Trachea: No tracheal deviation.   Cardiovascular:      Rate and Rhythm: Normal rate and regular rhythm.      Heart sounds: No murmur heard.    No friction rub. No gallop.    Pulmonary:      Effort: No respiratory distress.      Breath sounds: Normal breath sounds. No wheezing or rales.   Abdominal:      General: Bowel sounds are normal. There is no distension.      Palpations: Abdomen is soft. There is no mass.      Tenderness: There is abdominal tenderness (RUQ, epigastric).      Comments: Negative Diego's sign, no guarding   Musculoskeletal:         General: No deformity.      Cervical back: Neck supple.   Lymphadenopathy:      Cervical: No cervical adenopathy.   Skin:     General: Skin is warm and dry.      Findings: No rash.   Neurological:      Mental Status: She is alert and oriented to person, place, and time.       Significant Labs: CBC:   Recent Labs   Lab 10/15/22  0655 10/16/22  0414   WBC 4.91 6.18   HGB 12.3 11.2*   HCT 35.7* 32.8*    303     CMP:   Recent Labs   Lab 10/15/22  0655 10/16/22  0414    136   K 4.0 4.0    106   CO2 24 23   GLU 92 84   BUN 13 9   CREATININE 1.0 1.2   CALCIUM 8.4* 8.6*   PROT 6.0 5.5*   ALBUMIN 3.2* 3.0*   BILITOT 0.7 0.7   ALKPHOS 152* 125   AST 13 12   ALT 35 25   ANIONGAP 7* 7*       Significant Imaging: I have reviewed all pertinent imaging results/findings within the past 24 hours.

## 2022-10-16 NOTE — PROGRESS NOTES
[facility] Medicine  Progress Note    Patient Name: Charlette Blair  MRN: [id number]  Patient Class: IP- Inpatient   Admission Date: 10/14/2022  Length of Stay: 2 days  Attending Physician: Doron Ibarra MD  Primary Care Provider: Primary Doctor No        Subjective:     Principal Problem:Choledocholithiasis        HPI:  37-year-old female with a history of prior cholecystectomy, endometriosis, and rectal bleeding presented to [facility] ED on October 14 with pain to the right upper abdomen along with vomiting.  She was previously admitted there on August 16-18 with choledocholithiasis.  She underwent ERCP with balloon sweeps, basket retrievals, and biliary stent placement.  It was noted to be an unsuccessful extraction, and plan was for outpatient follow-up for further attempts to remove the stone.  She presented back to the emergency department on October 14 with worsening abdominal pain, nausea, and vomiting.  Imaging noted choledocholithiasis with prominence of the common bile duct.  ED provider spoke with Gastroenterology there, and they felt the patient needed more advanced biliary services.  She was felt not to have evidence of infection at present so abx were not started. Pt. Transferred to [facility] for AES.     COVID negative  October 14: Urinalysis with small leukocyte esterase, small blood, 10 RBC, 5 WBC, 13 epithelial cells, rare bacteria  Sodium 137, potassium 4.3, glucose 99, chloride 104, CO2 26, BUN 13, creatinine 0.9, calcium 9.7, total bilirubin 0.5, AST 17, ALT 50, lipase 30, white blood cells 8.7, hemoglobin 14, hematocrit 39.2, platelets 382     Abdominal ultrasound noted choledocholithiasis with mild prominence of the common bile duct.  A stent is not definitively visualized within the common bile duct.  CBD measures 7 mm in diameter.  Multiple echogenic foci within the common bile duct with posterior acoustic shadowing consistent with  choledocholithiasis.  Largest discrete measures 1.2 cm.         Overview/Hospital Course:  No notes on file    Interval History:   No events overnight. Patient tolerating diet. Continues to have post prandial pain with some nausea. Denies fevers, chills, chest pain, and emesis.      Review of Systems   Constitutional:  Negative for activity change, appetite change, chills, fever and unexpected weight change.   HENT:  Negative for congestion and sore throat.    Respiratory:  Negative for cough and shortness of breath.    Cardiovascular:  Negative for chest pain, palpitations and leg swelling.   Gastrointestinal:  Positive for abdominal pain and nausea. Negative for abdominal distention, blood in stool, constipation, diarrhea and vomiting.   Genitourinary:  Negative for difficulty urinating, dysuria and hematuria.   Musculoskeletal:  Negative for arthralgias and myalgias.   Skin:  Negative for color change and rash.   Neurological:  Negative for dizziness, tremors and seizures.   Objective:     Vital Signs (Most Recent):  Temp: 97.9 °F (36.6 °C) (10/16/22 1126)  Pulse: (!) 58 (10/16/22 1126)  Resp: 18 (10/16/22 1151)  BP: (!) 156/70 (10/16/22 1126)  SpO2: 100 % (10/16/22 1126)   Vital Signs (24h Range):  Temp:  [96.7 °F (35.9 °C)-97.9 °F (36.6 °C)] 97.9 °F (36.6 °C)  Pulse:  [52-67] 58  Resp:  [18] 18  SpO2:  [96 %-100 %] 100 %  BP: (118-156)/(60-83) 156/70     Weight: 50.1 kg (110 lb 7.2 oz)  There is no height or weight on file to calculate BMI.  No intake or output data in the 24 hours ending 10/16/22 1403   Physical Exam  Vitals reviewed.   Constitutional:       General: She is not in acute distress.     Appearance: She is well-developed.   HENT:      Head: Normocephalic and atraumatic.   Eyes:      Extraocular Movements: Extraocular movements intact.      Pupils: Pupils are equal, round, and reactive to light.   Neck:      Vascular: No JVD.      Trachea: No tracheal deviation.   Cardiovascular:      Rate and  Rhythm: Normal rate and regular rhythm.      Heart sounds: No murmur heard.    No friction rub. No gallop.   Pulmonary:      Effort: No respiratory distress.      Breath sounds: Normal breath sounds. No wheezing or rales.   Abdominal:      General: Bowel sounds are normal. There is no distension.      Palpations: Abdomen is soft. There is no mass.      Tenderness: There is abdominal tenderness (RUQ, epigastric).      Comments: Negative Diego's sign, no guarding   Musculoskeletal:         General: No deformity.      Cervical back: Neck supple.   Lymphadenopathy:      Cervical: No cervical adenopathy.   Skin:     General: Skin is warm and dry.      Findings: No rash.   Neurological:      Mental Status: She is alert and oriented to person, place, and time.       Significant Labs: CBC:   Recent Labs   Lab 10/15/22  0655 10/16/22  0414   WBC 4.91 6.18   HGB 12.3 11.2*   HCT 35.7* 32.8*    303     CMP:   Recent Labs   Lab 10/15/22  0655 10/16/22  0414    136   K 4.0 4.0    106   CO2 24 23   GLU 92 84   BUN 13 9   CREATININE 1.0 1.2   CALCIUM 8.4* 8.6*   PROT 6.0 5.5*   ALBUMIN 3.2* 3.0*   BILITOT 0.7 0.7   ALKPHOS 152* 125   AST 13 12   ALT 35 25   ANIONGAP 7* 7*       Significant Imaging: I have reviewed all pertinent imaging results/findings within the past 24 hours.      Assessment/Plan:      * Choledocholithiasis  - Recurrent RUQ pain and nausea after recent biliary stent placement  - U/S at outside hospital noted Choledocholithiasis with mild prominence of the common bile duct. ALT and alk phos mildly elevated, Tbili WNL  - AES consulted   -- Continue Zosyn  -- Plan for endoscopy on 10/17  -- Diet as tolerated  - Anti emetics   - PRN pain control    Anxiety  - Uses MJ at home  - PRN atarax     Tobacco abuse  - Smokes 1/2 to 3/4 ppd  - Nicotine patch daily        VTE Risk Mitigation (From admission, onward)         Ordered     IP VTE LOW RISK PATIENT  Once         10/14/22 2020     Place sequential  compression device  Until discontinued         10/14/22 2020                Discharge Planning   ELBA: 10/18/2022     Code Status: Full Code   Is the patient medically ready for discharge?: No    Reason for patient still in hospital (select all that apply): Patient trending condition, Laboratory test, Treatment, Consult recommendations and Pending disposition                     Doron Ibarra MD  Department of [facility] Medicine   [facility] - Surgery

## 2022-10-17 ENCOUNTER — ANESTHESIA EVENT (OUTPATIENT)
Dept: ENDOSCOPY | Facility: HOSPITAL | Age: 37
DRG: 446 | End: 2022-10-17
Payer: MEDICAID

## 2022-10-17 ENCOUNTER — ANESTHESIA (OUTPATIENT)
Dept: ENDOSCOPY | Facility: HOSPITAL | Age: 37
DRG: 446 | End: 2022-10-17
Payer: MEDICAID

## 2022-10-17 LAB
ALBUMIN SERPL BCP-MCNC: 3.3 G/DL (ref 3.5–5.2)
ALP SERPL-CCNC: 121 U/L (ref 55–135)
ALT SERPL W/O P-5'-P-CCNC: 23 U/L (ref 10–44)
ANION GAP SERPL CALC-SCNC: 10 MMOL/L (ref 8–16)
AST SERPL-CCNC: 13 U/L (ref 10–40)
B-HCG UR QL: NEGATIVE
BASOPHILS # BLD AUTO: 0.04 K/UL (ref 0–0.2)
BASOPHILS NFR BLD: 0.6 % (ref 0–1.9)
BILIRUB SERPL-MCNC: 0.5 MG/DL (ref 0.1–1)
BUN SERPL-MCNC: 7 MG/DL (ref 6–20)
CALCIUM SERPL-MCNC: 8.7 MG/DL (ref 8.7–10.5)
CHLORIDE SERPL-SCNC: 105 MMOL/L (ref 95–110)
CO2 SERPL-SCNC: 24 MMOL/L (ref 23–29)
CREAT SERPL-MCNC: 1.1 MG/DL (ref 0.5–1.4)
CTP QC/QA: YES
DIFFERENTIAL METHOD: ABNORMAL
EOSINOPHIL # BLD AUTO: 0.3 K/UL (ref 0–0.5)
EOSINOPHIL NFR BLD: 4.4 % (ref 0–8)
ERYTHROCYTE [DISTWIDTH] IN BLOOD BY AUTOMATED COUNT: 11.1 % (ref 11.5–14.5)
EST. GFR  (NO RACE VARIABLE): >60 ML/MIN/1.73 M^2
GLUCOSE SERPL-MCNC: 97 MG/DL (ref 70–110)
HCT VFR BLD AUTO: 34.3 % (ref 37–48.5)
HGB BLD-MCNC: 12.1 G/DL (ref 12–16)
IMM GRANULOCYTES # BLD AUTO: 0.01 K/UL (ref 0–0.04)
IMM GRANULOCYTES NFR BLD AUTO: 0.2 % (ref 0–0.5)
LYMPHOCYTES # BLD AUTO: 2.8 K/UL (ref 1–4.8)
LYMPHOCYTES NFR BLD: 41.8 % (ref 18–48)
MCH RBC QN AUTO: 34.1 PG (ref 27–31)
MCHC RBC AUTO-ENTMCNC: 35.3 G/DL (ref 32–36)
MCV RBC AUTO: 97 FL (ref 82–98)
MONOCYTES # BLD AUTO: 0.5 K/UL (ref 0.3–1)
MONOCYTES NFR BLD: 7.1 % (ref 4–15)
NEUTROPHILS # BLD AUTO: 3.1 K/UL (ref 1.8–7.7)
NEUTROPHILS NFR BLD: 45.9 % (ref 38–73)
NRBC BLD-RTO: 0 /100 WBC
PLATELET # BLD AUTO: 285 K/UL (ref 150–450)
PMV BLD AUTO: 10.1 FL (ref 9.2–12.9)
POTASSIUM SERPL-SCNC: 3.8 MMOL/L (ref 3.5–5.1)
PROT SERPL-MCNC: 6.1 G/DL (ref 6–8.4)
RBC # BLD AUTO: 3.55 M/UL (ref 4–5.4)
SARS-COV-2 RDRP RESP QL NAA+PROBE: NEGATIVE
SARS-COV-2 RNA RESP QL NAA+PROBE: NOT DETECTED
SODIUM SERPL-SCNC: 139 MMOL/L (ref 136–145)
WBC # BLD AUTO: 6.63 K/UL (ref 3.9–12.7)

## 2022-10-17 PROCEDURE — 27201674 HC SPHINCTERTOME: Performed by: INTERNAL MEDICINE

## 2022-10-17 PROCEDURE — 74328 PR  X-RAY FOR BILE DUCT ENDOSCOPY: ICD-10-PCS | Mod: 26,,, | Performed by: INTERNAL MEDICINE

## 2022-10-17 PROCEDURE — 43262 ENDO CHOLANGIOPANCREATOGRAPH: CPT | Performed by: INTERNAL MEDICINE

## 2022-10-17 PROCEDURE — 43264 ERCP REMOVE DUCT CALCULI: CPT | Performed by: INTERNAL MEDICINE

## 2022-10-17 PROCEDURE — 63600175 PHARM REV CODE 636 W HCPCS: Performed by: NURSE ANESTHETIST, CERTIFIED REGISTERED

## 2022-10-17 PROCEDURE — S4991 NICOTINE PATCH NONLEGEND: HCPCS | Performed by: STUDENT IN AN ORGANIZED HEALTH CARE EDUCATION/TRAINING PROGRAM

## 2022-10-17 PROCEDURE — 43264 PR ERCP,W/REMOVAL STONE,BIL/PANCR DUCTS: ICD-10-PCS | Mod: ,,, | Performed by: INTERNAL MEDICINE

## 2022-10-17 PROCEDURE — 63600175 PHARM REV CODE 636 W HCPCS: Performed by: HOSPITALIST

## 2022-10-17 PROCEDURE — 99232 SBSQ HOSP IP/OBS MODERATE 35: CPT | Mod: ,,, | Performed by: STUDENT IN AN ORGANIZED HEALTH CARE EDUCATION/TRAINING PROGRAM

## 2022-10-17 PROCEDURE — D9220A PRA ANESTHESIA: Mod: ANES,,, | Performed by: ANESTHESIOLOGY

## 2022-10-17 PROCEDURE — 43262 ENDO CHOLANGIOPANCREATOGRAPH: CPT | Mod: 51,,, | Performed by: INTERNAL MEDICINE

## 2022-10-17 PROCEDURE — 25000003 PHARM REV CODE 250: Performed by: STUDENT IN AN ORGANIZED HEALTH CARE EDUCATION/TRAINING PROGRAM

## 2022-10-17 PROCEDURE — 63600175 PHARM REV CODE 636 W HCPCS: Performed by: STUDENT IN AN ORGANIZED HEALTH CARE EDUCATION/TRAINING PROGRAM

## 2022-10-17 PROCEDURE — 25000003 PHARM REV CODE 250: Performed by: NURSE ANESTHETIST, CERTIFIED REGISTERED

## 2022-10-17 PROCEDURE — U0003 INFECTIOUS AGENT DETECTION BY NUCLEIC ACID (DNA OR RNA); SEVERE ACUTE RESPIRATORY SYNDROME CORONAVIRUS 2 (SARS-COV-2) (CORONAVIRUS DISEASE [COVID-19]), AMPLIFIED PROBE TECHNIQUE, MAKING USE OF HIGH THROUGHPUT TECHNOLOGIES AS DESCRIBED BY CMS-2020-01-R: HCPCS | Performed by: INTERNAL MEDICINE

## 2022-10-17 PROCEDURE — U0002 COVID-19 LAB TEST NON-CDC: HCPCS | Performed by: INTERNAL MEDICINE

## 2022-10-17 PROCEDURE — C1769 GUIDE WIRE: HCPCS | Performed by: INTERNAL MEDICINE

## 2022-10-17 PROCEDURE — 43264 ERCP REMOVE DUCT CALCULI: CPT | Mod: ,,, | Performed by: INTERNAL MEDICINE

## 2022-10-17 PROCEDURE — 94761 N-INVAS EAR/PLS OXIMETRY MLT: CPT

## 2022-10-17 PROCEDURE — 25000003 PHARM REV CODE 250: Performed by: HOSPITALIST

## 2022-10-17 PROCEDURE — 36415 COLL VENOUS BLD VENIPUNCTURE: CPT | Performed by: STUDENT IN AN ORGANIZED HEALTH CARE EDUCATION/TRAINING PROGRAM

## 2022-10-17 PROCEDURE — 74328 X-RAY BILE DUCT ENDOSCOPY: CPT | Mod: TC | Performed by: INTERNAL MEDICINE

## 2022-10-17 PROCEDURE — 80053 COMPREHEN METABOLIC PANEL: CPT | Performed by: STUDENT IN AN ORGANIZED HEALTH CARE EDUCATION/TRAINING PROGRAM

## 2022-10-17 PROCEDURE — U0005 INFEC AGEN DETEC AMPLI PROBE: HCPCS | Performed by: INTERNAL MEDICINE

## 2022-10-17 PROCEDURE — 37000008 HC ANESTHESIA 1ST 15 MINUTES: Performed by: INTERNAL MEDICINE

## 2022-10-17 PROCEDURE — D9220A PRA ANESTHESIA: ICD-10-PCS | Mod: ANES,,, | Performed by: ANESTHESIOLOGY

## 2022-10-17 PROCEDURE — 37000009 HC ANESTHESIA EA ADD 15 MINS: Performed by: INTERNAL MEDICINE

## 2022-10-17 PROCEDURE — 11000001 HC ACUTE MED/SURG PRIVATE ROOM

## 2022-10-17 PROCEDURE — 43262 PR ERCP,SPHINCTEROTOMY: ICD-10-PCS | Mod: 51,,, | Performed by: INTERNAL MEDICINE

## 2022-10-17 PROCEDURE — 85025 COMPLETE CBC W/AUTO DIFF WBC: CPT | Performed by: STUDENT IN AN ORGANIZED HEALTH CARE EDUCATION/TRAINING PROGRAM

## 2022-10-17 PROCEDURE — 27202125 HC BALLOON, EXTRACTION (ANY): Performed by: INTERNAL MEDICINE

## 2022-10-17 PROCEDURE — 81025 URINE PREGNANCY TEST: CPT | Performed by: INTERNAL MEDICINE

## 2022-10-17 PROCEDURE — 74328 X-RAY BILE DUCT ENDOSCOPY: CPT | Mod: 26,,, | Performed by: INTERNAL MEDICINE

## 2022-10-17 PROCEDURE — D9220A PRA ANESTHESIA: Mod: CRNA,,, | Performed by: NURSE ANESTHETIST, CERTIFIED REGISTERED

## 2022-10-17 PROCEDURE — 99232 PR SUBSEQUENT HOSPITAL CARE,LEVL II: ICD-10-PCS | Mod: ,,, | Performed by: STUDENT IN AN ORGANIZED HEALTH CARE EDUCATION/TRAINING PROGRAM

## 2022-10-17 PROCEDURE — 25500020 PHARM REV CODE 255: Performed by: INTERNAL MEDICINE

## 2022-10-17 PROCEDURE — D9220A PRA ANESTHESIA: ICD-10-PCS | Mod: CRNA,,, | Performed by: NURSE ANESTHETIST, CERTIFIED REGISTERED

## 2022-10-17 RX ORDER — MIDAZOLAM HYDROCHLORIDE 1 MG/ML
INJECTION, SOLUTION INTRAMUSCULAR; INTRAVENOUS
Status: DISCONTINUED | OUTPATIENT
Start: 2022-10-17 | End: 2022-10-17

## 2022-10-17 RX ORDER — LIDOCAINE HCL/PF 100 MG/5ML
SYRINGE (ML) INTRAVENOUS
Status: DISCONTINUED | OUTPATIENT
Start: 2022-10-17 | End: 2022-10-17

## 2022-10-17 RX ORDER — PROPOFOL 10 MG/ML
INJECTION, EMULSION INTRAVENOUS
Status: DISCONTINUED | OUTPATIENT
Start: 2022-10-17 | End: 2022-10-17

## 2022-10-17 RX ORDER — PROPOFOL 10 MG/ML
INJECTION, EMULSION INTRAVENOUS CONTINUOUS PRN
Status: DISCONTINUED | OUTPATIENT
Start: 2022-10-17 | End: 2022-10-17

## 2022-10-17 RX ADMIN — PROCHLORPERAZINE EDISYLATE 5 MG: 5 INJECTION INTRAMUSCULAR; INTRAVENOUS at 03:10

## 2022-10-17 RX ADMIN — PROPOFOL 20 MG: 10 INJECTION, EMULSION INTRAVENOUS at 03:10

## 2022-10-17 RX ADMIN — Medication 6 MG: at 09:10

## 2022-10-17 RX ADMIN — GLYCOPYRROLATE 0.2 MG: 0.2 INJECTION, SOLUTION INTRAMUSCULAR; INTRAVITREAL at 02:10

## 2022-10-17 RX ADMIN — ONDANSETRON 4 MG: 2 INJECTION INTRAMUSCULAR; INTRAVENOUS at 08:10

## 2022-10-17 RX ADMIN — PIPERACILLIN SODIUM AND TAZOBACTAM SODIUM 4.5 G: 4; .5 INJECTION, POWDER, LYOPHILIZED, FOR SOLUTION INTRAVENOUS at 10:10

## 2022-10-17 RX ADMIN — OXYCODONE HYDROCHLORIDE 10 MG: 10 TABLET ORAL at 03:10

## 2022-10-17 RX ADMIN — PIPERACILLIN SODIUM AND TAZOBACTAM SODIUM 4.5 G: 4; .5 INJECTION, POWDER, LYOPHILIZED, FOR SOLUTION INTRAVENOUS at 06:10

## 2022-10-17 RX ADMIN — PIPERACILLIN SODIUM AND TAZOBACTAM SODIUM 4.5 G: 4; .5 INJECTION, POWDER, LYOPHILIZED, FOR SOLUTION INTRAVENOUS at 04:10

## 2022-10-17 RX ADMIN — SODIUM CHLORIDE: 0.9 INJECTION, SOLUTION INTRAVENOUS at 02:10

## 2022-10-17 RX ADMIN — PROCHLORPERAZINE EDISYLATE 5 MG: 5 INJECTION INTRAMUSCULAR; INTRAVENOUS at 09:10

## 2022-10-17 RX ADMIN — MIDAZOLAM HYDROCHLORIDE 2 MG: 1 INJECTION, SOLUTION INTRAMUSCULAR; INTRAVENOUS at 03:10

## 2022-10-17 RX ADMIN — OXYCODONE HYDROCHLORIDE 10 MG: 10 TABLET ORAL at 04:10

## 2022-10-17 RX ADMIN — PROPOFOL 150 MCG/KG/MIN: 10 INJECTION, EMULSION INTRAVENOUS at 03:10

## 2022-10-17 RX ADMIN — ONDANSETRON 4 MG: 2 INJECTION INTRAMUSCULAR; INTRAVENOUS at 04:10

## 2022-10-17 RX ADMIN — OXYCODONE HYDROCHLORIDE 10 MG: 10 TABLET ORAL at 10:10

## 2022-10-17 RX ADMIN — PANTOPRAZOLE SODIUM 40 MG: 40 TABLET, DELAYED RELEASE ORAL at 08:10

## 2022-10-17 RX ADMIN — PROPOFOL 50 MG: 10 INJECTION, EMULSION INTRAVENOUS at 03:10

## 2022-10-17 RX ADMIN — OXYCODONE HYDROCHLORIDE 10 MG: 10 TABLET ORAL at 09:10

## 2022-10-17 RX ADMIN — NICOTINE 1 PATCH: 14 PATCH, EXTENDED RELEASE TRANSDERMAL at 08:10

## 2022-10-17 RX ADMIN — Medication 75 MG: at 03:10

## 2022-10-17 NOTE — SUBJECTIVE & OBJECTIVE
Interval History:   No events overnight.  Patient continued abdominal pain.  No other complaints.  Undergoing ERCP with GI.      Review of Systems   Constitutional:  Negative for activity change, appetite change, chills, fever and unexpected weight change.   HENT:  Negative for congestion and sore throat.    Respiratory:  Negative for cough and shortness of breath.    Cardiovascular:  Negative for chest pain, palpitations and leg swelling.   Gastrointestinal:  Positive for abdominal pain. Negative for abdominal distention, blood in stool, constipation, diarrhea, nausea and vomiting.   Genitourinary:  Negative for difficulty urinating, dysuria and hematuria.   Musculoskeletal:  Negative for arthralgias and myalgias.   Skin:  Negative for color change and rash.   Neurological:  Negative for dizziness, tremors and seizures.   Objective:     Vital Signs (Most Recent):  Temp: 97.8 °F (36.6 °C) (10/17/22 1612)  Pulse: 77 (10/17/22 1612)  Resp: 18 (10/17/22 1612)  BP: 134/77 (10/17/22 1612)  SpO2: 100 % (10/17/22 1612) Vital Signs (24h Range):  Temp:  [97.5 °F (36.4 °C)-98.2 °F (36.8 °C)] 97.8 °F (36.6 °C)  Pulse:  [54-98] 77  Resp:  [12-20] 18  SpO2:  [99 %-100 %] 100 %  BP: ()/(53-77) 134/77     Weight: 50.8 kg (112 lb)  Body mass index is 21.87 kg/m².    Intake/Output Summary (Last 24 hours) at 10/17/2022 1831  Last data filed at 10/17/2022 1513  Gross per 24 hour   Intake 120 ml   Output --   Net 120 ml      Physical Exam  Vitals reviewed.   Constitutional:       General: She is not in acute distress.     Appearance: She is well-developed.   HENT:      Head: Normocephalic and atraumatic.   Eyes:      Extraocular Movements: Extraocular movements intact.      Pupils: Pupils are equal, round, and reactive to light.   Neck:      Vascular: No JVD.      Trachea: No tracheal deviation.   Cardiovascular:      Rate and Rhythm: Normal rate and regular rhythm.      Heart sounds: No murmur heard.    No friction rub. No  gallop.   Pulmonary:      Effort: No respiratory distress.      Breath sounds: Normal breath sounds. No wheezing or rales.   Abdominal:      General: Bowel sounds are normal. There is no distension.      Palpations: Abdomen is soft. There is no mass.      Tenderness: There is abdominal tenderness (RUQ, epigastric).      Comments: Negative Diego's sign, no guarding   Musculoskeletal:         General: No deformity.      Cervical back: Neck supple.   Lymphadenopathy:      Cervical: No cervical adenopathy.   Skin:     General: Skin is warm and dry.      Findings: No rash.   Neurological:      Mental Status: She is alert and oriented to person, place, and time.       Significant Labs: CBC:   Recent Labs   Lab 10/16/22  0414 10/17/22  0353   WBC 6.18 6.63   HGB 11.2* 12.1   HCT 32.8* 34.3*    285     CMP:   Recent Labs   Lab 10/16/22  0414 10/17/22  0353    139   K 4.0 3.8    105   CO2 23 24   GLU 84 97   BUN 9 7   CREATININE 1.2 1.1   CALCIUM 8.6* 8.7   PROT 5.5* 6.1   ALBUMIN 3.0* 3.3*   BILITOT 0.7 0.5   ALKPHOS 125 121   AST 12 13   ALT 25 23   ANIONGAP 7* 10       Significant Imaging: I have reviewed all pertinent imaging results/findings within the past 24 hours.

## 2022-10-17 NOTE — PROGRESS NOTES
[facility] Medicine  Progress Note    Patient Name: Charlette Blair  MRN: [id number]  Patient Class: IP- Inpatient   Admission Date: 10/14/2022  Length of Stay: 3 days  Attending Physician: Doron Ibarra MD  Primary Care Provider: Primary Doctor No        Subjective:     Principal Problem:Choledocholithiasis        HPI:  37-year-old female with a history of prior cholecystectomy, endometriosis, and rectal bleeding presented to [facility] ED on October 14 with pain to the right upper abdomen along with vomiting.  She was previously admitted there on August 16-18 with choledocholithiasis.  She underwent ERCP with balloon sweeps, basket retrievals, and biliary stent placement.  It was noted to be an unsuccessful extraction, and plan was for outpatient follow-up for further attempts to remove the stone.  She presented back to the emergency department on October 14 with worsening abdominal pain, nausea, and vomiting.  Imaging noted choledocholithiasis with prominence of the common bile duct.  ED provider spoke with Gastroenterology there, and they felt the patient needed more advanced biliary services.  She was felt not to have evidence of infection at present so abx were not started. Pt. Transferred to [facility] for [facility].     COVID negative  October 14: Urinalysis with small leukocyte esterase, small blood, 10 RBC, 5 WBC, 13 epithelial cells, rare bacteria  Sodium 137, potassium 4.3, glucose 99, chloride 104, CO2 26, BUN 13, creatinine 0.9, calcium 9.7, total bilirubin 0.5, AST 17, ALT 50, lipase 30, white blood cells 8.7, hemoglobin 14, hematocrit 39.2, platelets 382     Abdominal ultrasound noted choledocholithiasis with mild prominence of the common bile duct.  A stent is not definitively visualized within the common bile duct.  CBD measures 7 mm in diameter.  Multiple echogenic foci within the common bile duct with posterior acoustic shadowing consistent with  choledocholithiasis.  Largest discrete measures 1.2 cm.         Overview/Hospital Course:  No notes on file    Interval History:   No events overnight.  Patient continued abdominal pain.  No other complaints.  Undergoing ERCP with GI.      Review of Systems   Constitutional:  Negative for activity change, appetite change, chills, fever and unexpected weight change.   HENT:  Negative for congestion and sore throat.    Respiratory:  Negative for cough and shortness of breath.    Cardiovascular:  Negative for chest pain, palpitations and leg swelling.   Gastrointestinal:  Positive for abdominal pain. Negative for abdominal distention, blood in stool, constipation, diarrhea, nausea and vomiting.   Genitourinary:  Negative for difficulty urinating, dysuria and hematuria.   Musculoskeletal:  Negative for arthralgias and myalgias.   Skin:  Negative for color change and rash.   Neurological:  Negative for dizziness, tremors and seizures.   Objective:     Vital Signs (Most Recent):  Temp: 97.8 °F (36.6 °C) (10/17/22 1612)  Pulse: 77 (10/17/22 1612)  Resp: 18 (10/17/22 1612)  BP: 134/77 (10/17/22 1612)  SpO2: 100 % (10/17/22 1612) Vital Signs (24h Range):  Temp:  [97.5 °F (36.4 °C)-98.2 °F (36.8 °C)] 97.8 °F (36.6 °C)  Pulse:  [54-98] 77  Resp:  [12-20] 18  SpO2:  [99 %-100 %] 100 %  BP: ()/(53-77) 134/77     Weight: 50.8 kg (112 lb)  Body mass index is 21.87 kg/m².    Intake/Output Summary (Last 24 hours) at 10/17/2022 1831  Last data filed at 10/17/2022 1513  Gross per 24 hour   Intake 120 ml   Output --   Net 120 ml      Physical Exam  Vitals reviewed.   Constitutional:       General: She is not in acute distress.     Appearance: She is well-developed.   HENT:      Head: Normocephalic and atraumatic.   Eyes:      Extraocular Movements: Extraocular movements intact.      Pupils: Pupils are equal, round, and reactive to light.   Neck:      Vascular: No JVD.      Trachea: No tracheal deviation.   Cardiovascular:       Rate and Rhythm: Normal rate and regular rhythm.      Heart sounds: No murmur heard.    No friction rub. No gallop.   Pulmonary:      Effort: No respiratory distress.      Breath sounds: Normal breath sounds. No wheezing or rales.   Abdominal:      General: Bowel sounds are normal. There is no distension.      Palpations: Abdomen is soft. There is no mass.      Tenderness: There is abdominal tenderness (RUQ, epigastric).      Comments: Negative Diego's sign, no guarding   Musculoskeletal:         General: No deformity.      Cervical back: Neck supple.   Lymphadenopathy:      Cervical: No cervical adenopathy.   Skin:     General: Skin is warm and dry.      Findings: No rash.   Neurological:      Mental Status: She is alert and oriented to person, place, and time.       Significant Labs: CBC:   Recent Labs   Lab 10/16/22  0414 10/17/22  0353   WBC 6.18 6.63   HGB 11.2* 12.1   HCT 32.8* 34.3*    285     CMP:   Recent Labs   Lab 10/16/22  0414 10/17/22  0353    139   K 4.0 3.8    105   CO2 23 24   GLU 84 97   BUN 9 7   CREATININE 1.2 1.1   CALCIUM 8.6* 8.7   PROT 5.5* 6.1   ALBUMIN 3.0* 3.3*   BILITOT 0.7 0.5   ALKPHOS 125 121   AST 12 13   ALT 25 23   ANIONGAP 7* 10       Significant Imaging: I have reviewed all pertinent imaging results/findings within the past 24 hours.      Assessment/Plan:      * Choledocholithiasis  - Recurrent RUQ pain and nausea after recent biliary stent placement  - U/S at outside hospital noted Choledocholithiasis with mild prominence of the common bile duct. ALT and alk phos mildly elevated, Tbili WNL  - AES consulted   -- Continue Zosyn  -- ERCP 10/17 with removal of choledocholithiasis by balloon extraction and biliary tree swept  -- Diet as tolerated  - Anti emetics   - PRN pain control    Anxiety  - Uses MJ at home  - PRN atarax     Tobacco abuse  - Smokes 1/2 to 3/4 ppd  - Nicotine patch daily        VTE Risk Mitigation (From admission, onward)         Ordered     IP  VTE LOW RISK PATIENT  Once         10/14/22 2020     Place sequential compression device  Until discontinued         10/14/22 2020                Discharge Planning   ELBA: 10/18/2022     Code Status: Full Code   Is the patient medically ready for discharge?: No    Reason for patient still in hospital (select all that apply): Patient trending condition, Laboratory test, Treatment and Pending disposition  Discharge Plan A: Home with family                  Doron Ibarra MD  Department of [facility] Medicine   [facility] - Surgery

## 2022-10-17 NOTE — ASSESSMENT & PLAN NOTE
- Recurrent RUQ pain and nausea after recent biliary stent placement  - U/S at outside hospital noted Choledocholithiasis with mild prominence of the common bile duct. ALT and alk phos mildly elevated, Tbili WNL  - AES consulted   -- Continue Zosyn  -- ERCP 10/17 with removal of choledocholithiasis by balloon extraction and biliary tree swept  -- Diet as tolerated  - Anti emetics   - PRN pain control

## 2022-10-17 NOTE — PROVATION PATIENT INSTRUCTIONS
Discharge Summary/Instructions after an Endoscopic Procedure  Patient Name: Charlette Blair  Patient MRN: [id number]  Patient YOB: 1985 Monday, October 17, 2022  Tawanda Freeman MD  Dear patient,  As a result of recent federal legislation (The Federal Cures Act), you may   receive lab or pathology results from your procedure in your [facility]   account before your physician is able to contact you. Your physician or   their representative will relay the results to you with their   recommendations at their soonest availability.  Thank you,  RESTRICTIONS:  During your procedure today, you received medications for sedation.  These   medications may affect your judgment, balance and coordination.  Therefore,   for 24 hours, you have the following restrictions:   - DO NOT drive a car, operate machinery, make legal/financial decisions,   sign important papers or drink alcohol.    ACTIVITY:  Today: no heavy lifting, straining or running due to procedural   sedation/anesthesia.  The following day: return to full activity including work.  DIET:  Eat and drink normally unless instructed otherwise.     TREATMENT FOR COMMON SIDE EFFECTS:  - Mild abdominal pain, nausea, belching, bloating or excessive gas:  rest,   eat lightly and use a heating pad.  - Sore Throat: treat with throat lozenges and/or gargle with warm salt   water.  - Because air was used during the procedure, expelling large amounts of air   from your rectum or belching is normal.  - If a bowel prep was taken, you may not have a bowel movement for 1-3 days.    This is normal.  SYMPTOMS TO WATCH FOR AND REPORT TO YOUR PHYSICIAN:  1. Abdominal pain or bloating, other than gas cramps.  2. Chest pain.  3. Back pain.  4. Signs of infection such as: chills or fever occurring within 24 hours   after the procedure.  5. Rectal bleeding, which would show as bright red, maroon, or black stools.   (A tablespoon of blood from the rectum is not serious, especially if    hemorrhoids are present.)  6. Vomiting.  7. Weakness or dizziness.  GO DIRECTLY TO THE NEAREST EMERGENCY ROOM IF YOU HAVE ANY OF THE FOLLOWING:      Difficulty breathing              Chills and/or fever over 101 F   Persistent vomiting and/or vomiting blood   Severe abdominal pain   Severe chest pain   Black, tarry stools   Bleeding- more than one tablespoon   Any other symptom or condition that you feel may need urgent attention  Your doctor recommends these additional instructions:  If any biopsies were taken, your doctors clinic will contact you in 1 to 2   weeks with any results.  - Return patient to [facility] for ongoing care.   - Resume previous diet.   - Continue present medications.   - Return to primary care physician at appointment to be scheduled.  For questions, problems or results please call your physician - Tawanda Freeman MD at Work:  [phone].  [facility], EMERGENCY ROOM PHONE NUMBER: [phone]  IF A COMPLICATION OR EMERGENCY SITUATION ARISES AND YOU ARE UNABLE TO REACH   YOUR PHYSICIAN - GO DIRECTLY TO THE EMERGENCY ROOM.  Tawanda Freeman MD  10/17/2022 3:23:35 PM  This report has been verified and signed electronically.  Dear patient,  As a result of recent federal legislation (The Federal Cures Act), you may   receive lab or pathology results from your procedure in your [facility]   account before your physician is able to contact you. Your physician or   their representative will relay the results to you with their   recommendations at their soonest availability.  Thank you,  PROVATION

## 2022-10-17 NOTE — ANESTHESIA PREPROCEDURE EVALUATION
10/17/2022  Charlette Blair is a 37 y.o., female.  History reviewed. No pertinent past medical history.  Patient Active Problem List   Diagnosis    Choledocholithiasis    Tobacco abuse    Anxiety     Social History     Socioeconomic History    Marital status: Unknown   Tobacco Use    Smoking status: Every Day     Types: Cigarettes   Substance and Sexual Activity    Alcohol use: Not Currently         Pre-op Assessment    I have reviewed the NPO Status.      Review of Systems      Physical Exam    Airway:  Mallampati: I           Anesthesia Plan  Type of Anesthesia, risks & benefits discussed:    Anesthesia Type: Gen Natural Airway  Intra-op Monitoring Plan: Standard ASA Monitors  Induction:  IV  Informed Consent: Informed consent signed with the Patient and all parties understand the risks and agree with anesthesia plan.  All questions answered.   ASA Score: 2    Ready For Surgery From Anesthesia Perspective.     .

## 2022-10-17 NOTE — TRANSFER OF CARE
Anesthesia Transfer of Care Note    Patient: Charlette Blair    Procedure(s) Performed: Procedure(s) (LRB):  ERCP (ENDOSCOPIC RETROGRADE CHOLANGIOPANCREATOGRAPHY) (N/A)    Patient location: PACU    Anesthesia Type: general    Transport from OR: Transported from OR on room air with adequate spontaneous ventilation    Post pain: adequate analgesia    Post assessment: no apparent anesthetic complications    Post vital signs: stable    Level of consciousness: awake    Nausea/Vomiting: no nausea/vomiting    Complications: none    Transfer of care protocol was followed      Last vitals:   Visit Vitals  /73   Pulse 98   Temp 36.4 °C (97.5 °F) (Temporal)   Resp 18   Ht 5' (1.524 m)   Wt 50.8 kg (112 lb)   SpO2 100%   Breastfeeding No   BMI 21.87 kg/m²

## 2022-10-17 NOTE — PLAN OF CARE
[facility]  Initial Discharge Assessment       Primary Care Provider: Primary Doctor No    Admission Diagnosis: Choledocholithiasis [K80.50]    Admission Date: 10/14/2022  Expected Discharge Date: 10/18/2022         Payor: MEDICAID / Plan: [place] / Product Type: Managed Medicaid /     Extended Emergency Contact Information  Primary Emergency Contact: Luis Ferris  Mobile Phone: [phone]  Relation: Significant other    Discharge Plan A: Home with family  Discharge Plan B: Home with family      [id number] - [place] - [address] AT [facility] [address]  [address] [address]  Phone: [phone] Fax: [phone]      Initial Assessment (most recent)       Adult Discharge Assessment - 10/17/22 1004          Discharge Assessment    Assessment Type Discharge Planning Assessment     Confirmed/corrected address, phone number and insurance Yes     Confirmed Demographics Correct on Facesheet     Source of Information patient;family   Luis Ferris - s.o.    Communicated ELBA with patient/caregiver Yes     Lives With significant other;child(katiuska), dependent     Do you expect to return to your current living situation? Yes     Do you have help at home or someone to help you manage your care at home? Yes     Who are your caregiver(s) and their phone number(s)? Luis Ferris and 17y/o son     Prior to hospitilization cognitive status: Alert/Oriented     Current cognitive status: Alert/Oriented     Home Layout Able to live on 1st floor     Equipment Currently Used at Home none     Do you currently have service(s) that help you manage your care at home? No     Do you take prescription medications? Yes     Do you have prescription coverage? Yes     Do you have any problems affording any of your prescribed medications? No     Is the patient taking medications as prescribed? yes     How do you get to doctors appointments? family or friend will provide     Are  you on dialysis? No     Do you take coumadin? No     Discharge Plan A Home with family     Discharge Plan B Home with family     DME Needed Upon Discharge  none     Discharge Plan discussed with: Spouse/sig other;Patient                   Patient lives with Luis Ferris-s.o. and 16y/o son in a single story home with three entry steps. Mr Ferris is primary CG and will provide help/assistance with patient care once home. Patient does not feel she will need any post acute services upon hospital discharge. Mr Ferris will provide transportation home.

## 2022-10-17 NOTE — NURSING TRANSFER
Nursing Transfer Note      10/17/2022     Transfer To: 541    Transfer via stretcher    Transfer with n/a     Transported by transport     Medicines sent: none    Chart send with patient: Yes    Notified: n/a     Patient reassessed at: 10/17/2022   1556

## 2022-10-18 VITALS
WEIGHT: 112 LBS | OXYGEN SATURATION: 99 % | HEART RATE: 50 BPM | BODY MASS INDEX: 21.99 KG/M2 | RESPIRATION RATE: 18 BRPM | SYSTOLIC BLOOD PRESSURE: 106 MMHG | TEMPERATURE: 97 F | HEIGHT: 60 IN | DIASTOLIC BLOOD PRESSURE: 54 MMHG

## 2022-10-18 LAB
ALBUMIN SERPL BCP-MCNC: 3.2 G/DL (ref 3.5–5.2)
ALP SERPL-CCNC: 107 U/L (ref 55–135)
ALT SERPL W/O P-5'-P-CCNC: 18 U/L (ref 10–44)
ANION GAP SERPL CALC-SCNC: 9 MMOL/L (ref 8–16)
AST SERPL-CCNC: 14 U/L (ref 10–40)
BASOPHILS # BLD AUTO: 0.04 K/UL (ref 0–0.2)
BASOPHILS NFR BLD: 0.5 % (ref 0–1.9)
BILIRUB SERPL-MCNC: 0.5 MG/DL (ref 0.1–1)
BUN SERPL-MCNC: 10 MG/DL (ref 6–20)
CALCIUM SERPL-MCNC: 8.6 MG/DL (ref 8.7–10.5)
CHLORIDE SERPL-SCNC: 106 MMOL/L (ref 95–110)
CO2 SERPL-SCNC: 22 MMOL/L (ref 23–29)
CREAT SERPL-MCNC: 1.3 MG/DL (ref 0.5–1.4)
DIFFERENTIAL METHOD: ABNORMAL
EOSINOPHIL # BLD AUTO: 0.4 K/UL (ref 0–0.5)
EOSINOPHIL NFR BLD: 4.7 % (ref 0–8)
ERYTHROCYTE [DISTWIDTH] IN BLOOD BY AUTOMATED COUNT: 11.3 % (ref 11.5–14.5)
EST. GFR  (NO RACE VARIABLE): 54 ML/MIN/1.73 M^2
GLUCOSE SERPL-MCNC: 79 MG/DL (ref 70–110)
HCT VFR BLD AUTO: 33.9 % (ref 37–48.5)
HGB BLD-MCNC: 11.8 G/DL (ref 12–16)
IMM GRANULOCYTES # BLD AUTO: 0.02 K/UL (ref 0–0.04)
IMM GRANULOCYTES NFR BLD AUTO: 0.3 % (ref 0–0.5)
LYMPHOCYTES # BLD AUTO: 2.3 K/UL (ref 1–4.8)
LYMPHOCYTES NFR BLD: 30.7 % (ref 18–48)
MCH RBC QN AUTO: 33.6 PG (ref 27–31)
MCHC RBC AUTO-ENTMCNC: 34.8 G/DL (ref 32–36)
MCV RBC AUTO: 97 FL (ref 82–98)
MONOCYTES # BLD AUTO: 0.4 K/UL (ref 0.3–1)
MONOCYTES NFR BLD: 5.5 % (ref 4–15)
NEUTROPHILS # BLD AUTO: 4.4 K/UL (ref 1.8–7.7)
NEUTROPHILS NFR BLD: 58.3 % (ref 38–73)
NRBC BLD-RTO: 0 /100 WBC
PLATELET # BLD AUTO: 270 K/UL (ref 150–450)
PMV BLD AUTO: 10.1 FL (ref 9.2–12.9)
POTASSIUM SERPL-SCNC: 3.9 MMOL/L (ref 3.5–5.1)
PROT SERPL-MCNC: 6 G/DL (ref 6–8.4)
RBC # BLD AUTO: 3.51 M/UL (ref 4–5.4)
SODIUM SERPL-SCNC: 137 MMOL/L (ref 136–145)
WBC # BLD AUTO: 7.49 K/UL (ref 3.9–12.7)

## 2022-10-18 PROCEDURE — 25000003 PHARM REV CODE 250: Performed by: STUDENT IN AN ORGANIZED HEALTH CARE EDUCATION/TRAINING PROGRAM

## 2022-10-18 PROCEDURE — 99239 HOSP IP/OBS DSCHRG MGMT >30: CPT | Mod: ,,, | Performed by: STUDENT IN AN ORGANIZED HEALTH CARE EDUCATION/TRAINING PROGRAM

## 2022-10-18 PROCEDURE — 63600175 PHARM REV CODE 636 W HCPCS: Performed by: HOSPITALIST

## 2022-10-18 PROCEDURE — 99232 PR SUBSEQUENT HOSPITAL CARE,LEVL II: ICD-10-PCS | Mod: ,,,

## 2022-10-18 PROCEDURE — 25000003 PHARM REV CODE 250: Performed by: HOSPITALIST

## 2022-10-18 PROCEDURE — 80053 COMPREHEN METABOLIC PANEL: CPT | Performed by: STUDENT IN AN ORGANIZED HEALTH CARE EDUCATION/TRAINING PROGRAM

## 2022-10-18 PROCEDURE — 85025 COMPLETE CBC W/AUTO DIFF WBC: CPT | Performed by: STUDENT IN AN ORGANIZED HEALTH CARE EDUCATION/TRAINING PROGRAM

## 2022-10-18 PROCEDURE — 99232 SBSQ HOSP IP/OBS MODERATE 35: CPT | Mod: ,,,

## 2022-10-18 PROCEDURE — S4991 NICOTINE PATCH NONLEGEND: HCPCS | Performed by: STUDENT IN AN ORGANIZED HEALTH CARE EDUCATION/TRAINING PROGRAM

## 2022-10-18 PROCEDURE — 63600175 PHARM REV CODE 636 W HCPCS: Performed by: STUDENT IN AN ORGANIZED HEALTH CARE EDUCATION/TRAINING PROGRAM

## 2022-10-18 PROCEDURE — 36415 COLL VENOUS BLD VENIPUNCTURE: CPT | Performed by: STUDENT IN AN ORGANIZED HEALTH CARE EDUCATION/TRAINING PROGRAM

## 2022-10-18 PROCEDURE — 99239 PR HOSPITAL DISCHARGE DAY,>30 MIN: ICD-10-PCS | Mod: ,,, | Performed by: STUDENT IN AN ORGANIZED HEALTH CARE EDUCATION/TRAINING PROGRAM

## 2022-10-18 RX ORDER — OXYCODONE HYDROCHLORIDE 10 MG/1
10 TABLET ORAL EVERY 6 HOURS PRN
Qty: 12 TABLET | Refills: 0 | Status: SHIPPED | OUTPATIENT
Start: 2022-10-18

## 2022-10-18 RX ADMIN — OXYCODONE HYDROCHLORIDE 10 MG: 10 TABLET ORAL at 03:10

## 2022-10-18 RX ADMIN — ONDANSETRON 4 MG: 2 INJECTION INTRAMUSCULAR; INTRAVENOUS at 03:10

## 2022-10-18 RX ADMIN — PROCHLORPERAZINE EDISYLATE 5 MG: 5 INJECTION INTRAMUSCULAR; INTRAVENOUS at 09:10

## 2022-10-18 RX ADMIN — PIPERACILLIN SODIUM AND TAZOBACTAM SODIUM 4.5 G: 4; .5 INJECTION, POWDER, LYOPHILIZED, FOR SOLUTION INTRAVENOUS at 06:10

## 2022-10-18 RX ADMIN — NICOTINE 1 PATCH: 14 PATCH, EXTENDED RELEASE TRANSDERMAL at 08:10

## 2022-10-18 RX ADMIN — PANTOPRAZOLE SODIUM 40 MG: 40 TABLET, DELAYED RELEASE ORAL at 08:10

## 2022-10-18 RX ADMIN — OXYCODONE HYDROCHLORIDE 10 MG: 10 TABLET ORAL at 09:10

## 2022-10-18 NOTE — PROGRESS NOTES
[facility]  Gastroenterology  Progress Note    Patient Name: Charlette Blair  MRN: [id number]  Admission Date: 10/14/2022  Hospital Length of Stay: 4 days  Code Status: Full Code   Attending Provider: Doron Ibarra MD  Consulting Provider: Jessa Velez NP  Primary Care Physician: Primary Doctor No  Principal Problem: Choledocholithiasis      Subjective:     Interval History: 1 day s/p ERCP for choledocholithiasis. C/o mild nausea and RUQ soreness; however, pain is markedly improved from yesterday. Patient is tolerating PO intake without post-prandial discomfort. Afebrile and normotensive overnight. WBC and LFTs are unremarkable.       Review of Systems   Constitutional:  Negative for chills, diaphoresis and fever.   HENT:  Negative for trouble swallowing and voice change.    Eyes:  Negative for discharge and redness.   Gastrointestinal:  Positive for abdominal pain and nausea. Negative for abdominal distention, blood in stool, diarrhea and vomiting.   Skin:  Negative for color change.   Neurological:  Negative for syncope and speech difficulty.   Objective:     Vital Signs (Most Recent):  Temp: 96.9 °F (36.1 °C) (10/18/22 0839)  Pulse: (!) 50 (10/18/22 0839)  Resp: 18 (10/18/22 0955)  BP: (!) 106/54 (10/18/22 0839)  SpO2: 99 % (10/18/22 0839)   Vital Signs (24h Range):  Temp:  [96.9 °F (36.1 °C)-98.1 °F (36.7 °C)] 96.9 °F (36.1 °C)  Pulse:  [50-98] 50  Resp:  [16-20] 18  SpO2:  [98 %-100 %] 99 %  BP: ()/(54-77) 106/54     Weight: 50.8 kg (112 lb) (10/17/22 1321)  Body mass index is 21.87 kg/m².      Intake/Output Summary (Last 24 hours) at 10/18/2022 1040  Last data filed at 10/17/2022 1513  Gross per 24 hour   Intake 100 ml   Output --   Net 100 ml       Lines/Drains/Airways       Peripheral Intravenous Line  Duration                  Peripheral IV - Single Lumen 10/16/22 0950 20 G Left;Anterior Hand 2 days                    Physical Exam  Vitals and nursing note reviewed.   Constitutional:        General: She is not in acute distress.     Appearance: She is normal weight. She is not toxic-appearing or diaphoretic.   Eyes:      General: No scleral icterus.  Abdominal:      General: Abdomen is flat. Bowel sounds are normal. There is no distension.      Palpations: Abdomen is soft. There is no mass.      Tenderness: There is abdominal tenderness in the right upper quadrant. There is no guarding or rebound.   Skin:     General: Skin is warm and dry.      Coloration: Skin is not jaundiced.   Neurological:      Mental Status: She is alert and oriented to person, place, and time.       Significant Labs:  CBC:   Recent Labs   Lab 10/17/22  0353 10/18/22  0347   WBC 6.63 7.49   HGB 12.1 11.8*   HCT 34.3* 33.9*    270     CMP:   Recent Labs   Lab 10/18/22  0347   GLU 79   CALCIUM 8.6*   ALBUMIN 3.2*   PROT 6.0      K 3.9   CO2 22*      BUN 10   CREATININE 1.3   ALKPHOS 107   ALT 18   AST 14   BILITOT 0.5         Significant Imaging:  Imaging results within the past 24 hours have been reviewed.    Assessment/Plan:     * Choledocholithiasis  36 yo female with PMH of cholecystectomy 1 day s/p ERCP for choledocholithiasis with complete removal of stones.     Recommendations:  - Follow up with PCP   - Resume previous diet   - Continue present medications      Thank you for your consult. After careful consideration and discussion with Dr. Freeman, Advanced Endoscopy Service will sign off on this patient. Please contact us for any further questions or concerns.     DEMETRICE Otero  Advanced Endoscopy Nurse Practitioner  [facility] V[facility] NP  Gastroenterology  [facility] - Surgery

## 2022-10-18 NOTE — ASSESSMENT & PLAN NOTE
36 yo female with PMH of cholecystectomy 1 day s/p ERCP for choledocholithiasis with complete removal of stones.     Recommendations:  - Follow up with PCP   - Resume previous diet   - Continue present medications

## 2022-10-18 NOTE — SUBJECTIVE & OBJECTIVE
Subjective:     Interval History: 1 day s/p ERCP for choledocholithiasis. C/o mild nausea and RUQ soreness; however, pain is markedly improved from yesterday. Patient is tolerating PO intake without post-prandial discomfort. Afebrile and normotensive overnight. WBC and LFTs are unremarkable.       Review of Systems   Constitutional:  Negative for chills, diaphoresis and fever.   HENT:  Negative for trouble swallowing and voice change.    Eyes:  Negative for discharge and redness.   Gastrointestinal:  Positive for abdominal pain and nausea. Negative for abdominal distention, blood in stool, diarrhea and vomiting.   Skin:  Negative for color change.   Neurological:  Negative for syncope and speech difficulty.   Objective:     Vital Signs (Most Recent):  Temp: 96.9 °F (36.1 °C) (10/18/22 0839)  Pulse: (!) 50 (10/18/22 0839)  Resp: 18 (10/18/22 0955)  BP: (!) 106/54 (10/18/22 0839)  SpO2: 99 % (10/18/22 0839)   Vital Signs (24h Range):  Temp:  [96.9 °F (36.1 °C)-98.1 °F (36.7 °C)] 96.9 °F (36.1 °C)  Pulse:  [50-98] 50  Resp:  [16-20] 18  SpO2:  [98 %-100 %] 99 %  BP: ()/(54-77) 106/54     Weight: 50.8 kg (112 lb) (10/17/22 1321)  Body mass index is 21.87 kg/m².      Intake/Output Summary (Last 24 hours) at 10/18/2022 1040  Last data filed at 10/17/2022 1513  Gross per 24 hour   Intake 100 ml   Output --   Net 100 ml       Lines/Drains/Airways       Peripheral Intravenous Line  Duration                  Peripheral IV - Single Lumen 10/16/22 0950 20 G Left;Anterior Hand 2 days                    Physical Exam  Vitals and nursing note reviewed.   Constitutional:       General: She is not in acute distress.     Appearance: She is normal weight. She is not toxic-appearing or diaphoretic.   Eyes:      General: No scleral icterus.  Abdominal:      General: Abdomen is flat. Bowel sounds are normal. There is no distension.      Palpations: Abdomen is soft. There is no mass.      Tenderness: There is abdominal tenderness  in the right upper quadrant. There is no guarding or rebound.   Skin:     General: Skin is warm and dry.      Coloration: Skin is not jaundiced.   Neurological:      Mental Status: She is alert and oriented to person, place, and time.       Significant Labs:  CBC:   Recent Labs   Lab 10/17/22  0353 10/18/22  0347   WBC 6.63 7.49   HGB 12.1 11.8*   HCT 34.3* 33.9*    270     CMP:   Recent Labs   Lab 10/18/22  0347   GLU 79   CALCIUM 8.6*   ALBUMIN 3.2*   PROT 6.0      K 3.9   CO2 22*      BUN 10   CREATININE 1.3   ALKPHOS 107   ALT 18   AST 14   BILITOT 0.5         Significant Imaging:  Imaging results within the past 24 hours have been reviewed.

## 2022-10-19 NOTE — ANESTHESIA POSTPROCEDURE EVALUATION
Anesthesia Post Evaluation    Patient: Charlette Blair    Procedure(s) Performed: Procedure(s) (LRB):  ERCP (ENDOSCOPIC RETROGRADE CHOLANGIOPANCREATOGRAPHY) (N/A)    Final Anesthesia Type: general      Patient location during evaluation: PACU  Patient participation: Yes- Able to Participate  Level of consciousness: awake and alert  Post-procedure vital signs: reviewed and stable  Pain management: adequate  Airway patency: patent    PONV status at discharge: No PONV  Anesthetic complications: no      Cardiovascular status: blood pressure returned to baseline and hemodynamically stable  Respiratory status: unassisted and spontaneous ventilation  Hydration status: euvolemic  Follow-up not needed.          Vitals Value Taken Time   /54 10/18/22 0839   Temp 36.1 °C (96.9 °F) 10/18/22 0839   Pulse 50 10/18/22 0839   Resp 18 10/18/22 0955   SpO2 99 % 10/18/22 0839         Event Time   Out of Recovery 15:51:00         Pain/Willy Score: Pain Rating Prior to Med Admin: 7 (10/18/2022  9:55 AM)

## 2022-10-19 NOTE — PLAN OF CARE
[facility]  Discharge Final Note    Primary Care Provider: Primary Doctor No    Expected Discharge Date: 10/18/2022    Final Discharge Note (most recent)       Final Note - 10/18/22 1248          Final Note    Assessment Type Final Discharge Note     Anticipated Discharge Disposition Home or Self Care     What phone number can be called within the next 1-3 days to see how you are doing after discharge? [phone]     Hospital Resources/Appts/Education Provided Provided patient/caregiver with written discharge plan information;Appointments scheduled by Navigator/Coordinator

## 2022-10-20 NOTE — HOSPITAL COURSE
Started on Zosyn for empiric antibiotic coverage for ascending cholangitis.  Patient underwent the ERCP with balloon sweeping of biliary tract.  Patient reported improvement of abdominal pain, nausea, and postprandial pain after procedure.  Patient discharged home with primary care follow-up.

## 2022-10-20 NOTE — DISCHARGE SUMMARY
[facility] Medicine  Discharge Summary      Patient Name: Charlette Blair  MRN: [id number]  Patient Class: IP- Inpatient  Admission Date: 10/14/2022  Hospital Length of Stay: 4 days  Discharge Date and Time: 10/18/2022 12:48 PM  Attending Physician: Lizett att. providers found   Discharging Provider: Doron Ibarra MD  Primary Care Provider: Primary Doctor [facility] Medicine Team: [facility]  Doron Ibarra MD    HPI:   37-year-old female with a history of prior cholecystectomy, endometriosis, and rectal bleeding presented to [facility] ED on October 14 with pain to the right upper abdomen along with vomiting.  She was previously admitted there on August 16-18 with choledocholithiasis.  She underwent ERCP with balloon sweeps, basket retrievals, and biliary stent placement.  It was noted to be an unsuccessful extraction, and plan was for outpatient follow-up for further attempts to remove the stone.  She presented back to the emergency department on October 14 with worsening abdominal pain, nausea, and vomiting.  Imaging noted choledocholithiasis with prominence of the common bile duct.  ED provider spoke with Gastroenterology there, and they felt the patient needed more advanced biliary services.  She was felt not to have evidence of infection at present so abx were not started. Pt. Transferred to [facility] for [facility].     COVID negative  October 14: Urinalysis with small leukocyte esterase, small blood, 10 RBC, 5 WBC, 13 epithelial cells, rare bacteria  Sodium 137, potassium 4.3, glucose 99, chloride 104, CO2 26, BUN 13, creatinine 0.9, calcium 9.7, total bilirubin 0.5, AST 17, ALT 50, lipase 30, white blood cells 8.7, hemoglobin 14, hematocrit 39.2, platelets 382     Abdominal ultrasound noted choledocholithiasis with mild prominence of the common bile duct.  A stent is not definitively visualized within the common bile duct.  CBD measures 7 mm in diameter.  Multiple  echogenic foci within the common bile duct with posterior acoustic shadowing consistent with choledocholithiasis.  Largest discrete measures 1.2 cm.         Procedure(s) (LRB):  ERCP (ENDOSCOPIC RETROGRADE CHOLANGIOPANCREATOGRAPHY) (N/A)      Hospital Course:   Started on Zosyn for empiric antibiotic coverage for ascending cholangitis.  Patient underwent the ERCP with balloon sweeping of biliary tract.  Patient reported improvement of abdominal pain, nausea, and postprandial pain after procedure.  Patient discharged home with primary care follow-up.       Goals of Care Treatment Preferences:  Code Status: Full Code      Consults:   Consults (From admission, onward)        Status Ordering Provider     Inpatient consult to [facility]  Once        Provider:  (Not yet assigned)    Completed SIMONE RUBI          No new Assessment & Plan notes have been filed under this hospital service since the last note was generated.  Service: Hospital Medicine    Final Active Diagnoses:    Diagnosis Date Noted POA    PRINCIPAL PROBLEM:  Choledocholithiasis [K80.50] 10/14/2022 Yes    Tobacco abuse [Z72.0] 10/15/2022 Yes    Anxiety [F41.9] 10/15/2022 Yes      Problems Resolved During this Admission:       Discharged Condition: good    Disposition: Home or Self Care    Follow Up:    Patient Instructions:      Ambulatory referral/consult to Internal Medicine   Standing Status: Future   Referral Priority: Routine Referral Type: Consultation   Referral Reason: Specialty Services Required   Requested Specialty: Internal Medicine   Number of Visits Requested: 1     Diet Adult Regular     Notify your health care provider if you experience any of the following:  increased confusion or weakness     Notify your health care provider if you experience any of the following:  persistent dizziness, light-headedness, or visual disturbances     Notify your health care provider if you experience any of the following:   worsening rash     Notify your health care provider if you experience any of the following:  severe persistent headache     Notify your health care provider if you experience any of the following:  difficulty breathing or increased cough     Notify your health care provider if you experience any of the following:  redness, tenderness, or signs of infection (pain, swelling, redness, odor or green/yellow discharge around incision site)     Notify your health care provider if you experience any of the following:  severe uncontrolled pain     Notify your health care provider if you experience any of the following:  persistent nausea and vomiting or diarrhea     Notify your health care provider if you experience any of the following:  temperature >100.4     Activity as tolerated       Significant Diagnostic Studies: Labs: All labs within the past 24 hours have been reviewed    Pending Diagnostic Studies:     None         Medications:  Reconciled Home Medications:      Medication List      START taking these medications    oxyCODONE 10 mg Tab immediate release tablet  Commonly known as: ROXICODONE  Take 1 tablet (10 mg total) by mouth every 6 (six) hours as needed.            Indwelling Lines/Drains at time of discharge:   Lines/Drains/Airways     None                 Time spent on the discharge of patient: 35 minutes         Doron Ibarra MD  Department of Hospital Medicine  [facility] - Surgery

## 2024-09-20 ENCOUNTER — HOSPITAL ENCOUNTER (EMERGENCY)
Facility: HOSPITAL | Age: 39
Discharge: HOME | End: 2024-09-20
Attending: EMERGENCY MEDICINE
Payer: MEDICAID

## 2024-09-20 VITALS
RESPIRATION RATE: 18 BRPM | HEART RATE: 62 BPM | BODY MASS INDEX: 22.18 KG/M2 | SYSTOLIC BLOOD PRESSURE: 116 MMHG | TEMPERATURE: 98 F | WEIGHT: 110 LBS | HEIGHT: 59 IN | DIASTOLIC BLOOD PRESSURE: 67 MMHG | OXYGEN SATURATION: 100 %

## 2024-09-20 DIAGNOSIS — H53.9 VISUAL DISTURBANCE: ICD-10-CM

## 2024-09-20 DIAGNOSIS — R51.9 NONINTRACTABLE HEADACHE, UNSPECIFIED CHRONICITY PATTERN, UNSPECIFIED HEADACHE TYPE: Primary | ICD-10-CM

## 2024-09-20 DIAGNOSIS — R07.9 CHEST PAIN: ICD-10-CM

## 2024-09-20 LAB
ALBUMIN SERPL-MCNC: 4.8 G/DL (ref 3.4–5)
ALBUMIN/GLOB SERPL: 1.7 RATIO
ALP SERPL-CCNC: 47 UNIT/L (ref 50–144)
ALT SERPL-CCNC: 19 UNIT/L (ref 1–45)
AMPHET UR QL SCN: NEGATIVE
ANION GAP SERPL CALC-SCNC: 8 MEQ/L (ref 2–13)
AST SERPL-CCNC: 28 UNIT/L (ref 14–36)
B-HCG UR QL: NEGATIVE
BARBITURATE SCN PRESENT UR: NEGATIVE
BASOPHILS # BLD AUTO: 0.02 X10(3)/MCL (ref 0.01–0.08)
BASOPHILS NFR BLD AUTO: 0.3 % (ref 0.1–1.2)
BENZODIAZ UR QL SCN: NEGATIVE
BILIRUB SERPL-MCNC: 0.2 MG/DL (ref 0–1)
BILIRUB UR QL STRIP.AUTO: NEGATIVE
BNP BLD-MCNC: 381 PG/ML (ref 0–124.9)
BUN SERPL-MCNC: 15 MG/DL (ref 7–20)
CALCIUM SERPL-MCNC: 9.8 MG/DL (ref 8.4–10.2)
CANNABINOIDS UR QL SCN: POSITIVE
CHLORIDE SERPL-SCNC: 104 MMOL/L (ref 98–110)
CLARITY UR: CLEAR
CO2 SERPL-SCNC: 26 MMOL/L (ref 21–32)
COCAINE UR QL SCN: NEGATIVE
COLOR UR AUTO: YELLOW
CREAT SERPL-MCNC: 1.09 MG/DL (ref 0.66–1.25)
CREAT/UREA NIT SERPL: 14 (ref 12–20)
EOSINOPHIL # BLD AUTO: 0.04 X10(3)/MCL (ref 0.04–0.36)
EOSINOPHIL NFR BLD AUTO: 0.6 % (ref 0.7–7)
ERYTHROCYTE [DISTWIDTH] IN BLOOD BY AUTOMATED COUNT: 11.5 % (ref 11–14.5)
GFR SERPLBLD CREATININE-BSD FMLA CKD-EPI: 66 ML/MIN/1.73/M2
GLOBULIN SER-MCNC: 2.8 GM/DL (ref 2–3.9)
GLUCOSE SERPL-MCNC: 108 MG/DL (ref 70–115)
GLUCOSE UR QL STRIP: NEGATIVE
HCT VFR BLD AUTO: 37.7 % (ref 36–48)
HGB BLD-MCNC: 13.5 G/DL (ref 11.8–16)
HGB UR QL STRIP: NEGATIVE
IMM GRANULOCYTES # BLD AUTO: 0.01 X10(3)/MCL (ref 0–0.03)
IMM GRANULOCYTES NFR BLD AUTO: 0.1 % (ref 0–0.5)
INR PPP: 1
KETONES UR QL STRIP: NEGATIVE
LEUKOCYTE ESTERASE UR QL STRIP: NEGATIVE
LYMPHOCYTES # BLD AUTO: 1.4 X10(3)/MCL (ref 1.16–3.74)
LYMPHOCYTES NFR BLD AUTO: 20.1 % (ref 20–55)
MAGNESIUM SERPL-MCNC: 2 MG/DL (ref 1.8–2.4)
MCH RBC QN AUTO: 33.8 PG (ref 27–34)
MCHC RBC AUTO-ENTMCNC: 35.8 G/DL (ref 31–37)
MCV RBC AUTO: 94.5 FL (ref 79–99)
METHADONE UR QL SCN: NEGATIVE
MONOCYTES # BLD AUTO: 0.34 X10(3)/MCL (ref 0.24–0.36)
MONOCYTES NFR BLD AUTO: 4.9 % (ref 4.7–12.5)
NEUTROPHILS # BLD AUTO: 5.14 X10(3)/MCL (ref 1.56–6.13)
NEUTROPHILS NFR BLD AUTO: 74 % (ref 37–73)
NITRITE UR QL STRIP: NEGATIVE
NRBC BLD AUTO-RTO: 0 %
OPIATES UR QL SCN: NEGATIVE
PCP UR QL: NEGATIVE
PH UR STRIP: 6 [PH]
PH UR: 6 [PH] (ref 5–8)
PLATELET # BLD AUTO: 264 X10(3)/MCL (ref 140–371)
PMV BLD AUTO: 10 FL (ref 9.4–12.4)
POTASSIUM SERPL-SCNC: 4.2 MMOL/L (ref 3.5–5.1)
PROT SERPL-MCNC: 7.6 GM/DL (ref 6.3–8.2)
PROT UR QL STRIP: NEGATIVE
PROTHROMBIN TIME: 9.9 SECONDS (ref 9.3–11.9)
RBC # BLD AUTO: 3.99 X10(6)/MCL (ref 4–5.1)
SODIUM SERPL-SCNC: 138 MMOL/L (ref 136–145)
SP GR UR STRIP.AUTO: <=1.005 (ref 1–1.03)
TROPONIN I SERPL-MCNC: <0.012 NG/ML (ref 0–0.03)
UROBILINOGEN UR STRIP-ACNC: 0.2
WBC # BLD AUTO: 6.95 X10(3)/MCL (ref 4–11.5)

## 2024-09-20 PROCEDURE — 93005 ELECTROCARDIOGRAM TRACING: CPT

## 2024-09-20 PROCEDURE — 25500020 PHARM REV CODE 255: Performed by: EMERGENCY MEDICINE

## 2024-09-20 PROCEDURE — 85025 COMPLETE CBC W/AUTO DIFF WBC: CPT | Performed by: EMERGENCY MEDICINE

## 2024-09-20 PROCEDURE — 81003 URINALYSIS AUTO W/O SCOPE: CPT | Performed by: EMERGENCY MEDICINE

## 2024-09-20 PROCEDURE — 85610 PROTHROMBIN TIME: CPT | Performed by: EMERGENCY MEDICINE

## 2024-09-20 PROCEDURE — 96366 THER/PROPH/DIAG IV INF ADDON: CPT

## 2024-09-20 PROCEDURE — 63600175 PHARM REV CODE 636 W HCPCS: Performed by: EMERGENCY MEDICINE

## 2024-09-20 PROCEDURE — 96361 HYDRATE IV INFUSION ADD-ON: CPT

## 2024-09-20 PROCEDURE — 96365 THER/PROPH/DIAG IV INF INIT: CPT

## 2024-09-20 PROCEDURE — 83880 ASSAY OF NATRIURETIC PEPTIDE: CPT | Performed by: EMERGENCY MEDICINE

## 2024-09-20 PROCEDURE — 80307 DRUG TEST PRSMV CHEM ANLYZR: CPT | Performed by: EMERGENCY MEDICINE

## 2024-09-20 PROCEDURE — 81025 URINE PREGNANCY TEST: CPT | Performed by: EMERGENCY MEDICINE

## 2024-09-20 PROCEDURE — 80053 COMPREHEN METABOLIC PANEL: CPT | Performed by: EMERGENCY MEDICINE

## 2024-09-20 PROCEDURE — 93010 ELECTROCARDIOGRAM REPORT: CPT | Mod: ,,, | Performed by: INTERNAL MEDICINE

## 2024-09-20 PROCEDURE — 96375 TX/PRO/DX INJ NEW DRUG ADDON: CPT | Mod: 59

## 2024-09-20 PROCEDURE — 83735 ASSAY OF MAGNESIUM: CPT | Performed by: EMERGENCY MEDICINE

## 2024-09-20 PROCEDURE — 84484 ASSAY OF TROPONIN QUANT: CPT | Performed by: EMERGENCY MEDICINE

## 2024-09-20 PROCEDURE — 99285 EMERGENCY DEPT VISIT HI MDM: CPT | Mod: 25

## 2024-09-20 RX ORDER — DIPHENHYDRAMINE HYDROCHLORIDE 50 MG/ML
25 INJECTION INTRAMUSCULAR; INTRAVENOUS
Status: COMPLETED | OUTPATIENT
Start: 2024-09-20 | End: 2024-09-20

## 2024-09-20 RX ORDER — MAGNESIUM SULFATE HEPTAHYDRATE 40 MG/ML
2 INJECTION, SOLUTION INTRAVENOUS
Status: COMPLETED | OUTPATIENT
Start: 2024-09-20 | End: 2024-09-20

## 2024-09-20 RX ORDER — METOCLOPRAMIDE HYDROCHLORIDE 5 MG/ML
10 INJECTION INTRAMUSCULAR; INTRAVENOUS
Status: COMPLETED | OUTPATIENT
Start: 2024-09-20 | End: 2024-09-20

## 2024-09-20 RX ORDER — DEXAMETHASONE SODIUM PHOSPHATE 4 MG/ML
10 INJECTION, SOLUTION INTRA-ARTICULAR; INTRALESIONAL; INTRAMUSCULAR; INTRAVENOUS; SOFT TISSUE
Status: COMPLETED | OUTPATIENT
Start: 2024-09-20 | End: 2024-09-20

## 2024-09-20 RX ORDER — KETOROLAC TROMETHAMINE 30 MG/ML
30 INJECTION, SOLUTION INTRAMUSCULAR; INTRAVENOUS
Status: COMPLETED | OUTPATIENT
Start: 2024-09-20 | End: 2024-09-20

## 2024-09-20 RX ADMIN — IOHEXOL 75 ML: 350 INJECTION, SOLUTION INTRAVENOUS at 02:09

## 2024-09-20 RX ADMIN — DEXAMETHASONE SODIUM PHOSPHATE 10 MG: 4 INJECTION, SOLUTION INTRA-ARTICULAR; INTRALESIONAL; INTRAMUSCULAR; INTRAVENOUS; SOFT TISSUE at 03:09

## 2024-09-20 RX ADMIN — DIPHENHYDRAMINE HYDROCHLORIDE 25 MG: 50 INJECTION INTRAMUSCULAR; INTRAVENOUS at 01:09

## 2024-09-20 RX ADMIN — METOCLOPRAMIDE HYDROCHLORIDE 10 MG: 5 INJECTION INTRAMUSCULAR; INTRAVENOUS at 01:09

## 2024-09-20 RX ADMIN — MAGNESIUM SULFATE HEPTAHYDRATE 2 G: 40 INJECTION, SOLUTION INTRAVENOUS at 03:09

## 2024-09-20 RX ADMIN — KETOROLAC TROMETHAMINE 30 MG: 30 INJECTION, SOLUTION INTRAMUSCULAR at 04:09

## 2024-09-20 RX ADMIN — SODIUM CHLORIDE, POTASSIUM CHLORIDE, SODIUM LACTATE AND CALCIUM CHLORIDE 1000 ML: 600; 310; 30; 20 INJECTION, SOLUTION INTRAVENOUS at 01:09

## 2024-09-20 NOTE — ED PROVIDER NOTES
"Encounter Date: 9/20/2024       History     Chief Complaint   Patient presents with    Chest Pain    Nausea    Headache    Blurred Vision     Pt presented to ED per POV with c/o chest pain, nausea and headache with blurred vision onset yesterday. Pt reports fall on yesterday hitting head, pt denies use of blood thinners.      39-year-old female with past medical history of gallbladder problems status post cholecystectomy, presents emergency department with headache, lightheadedness, possible syncope versus seizure, chest pain.  Patient says that yesterday she had an episode where she started to tingle arms bilaterally.  She says that shortly afterwards she developed a severe headache and then she says she remembers being slumped over the backside of the couch with her  and her dog on top of her.   said that she was "flopping like a fish".  Question whether or not she was confused afterwards.  No reported urinary incontinence no reported bowel incontinence.  No reported tongue biting.  Patient says that she has had a headache and feels like her head is going to explode ever since she says that she has had some double vision.  She says that she had some chest pain earlier today when she thinks it is mostly anxiety as it feels similar to anxiety.  It is gone currently.      Review of patient's allergies indicates:   Allergen Reactions    Keflex [cephalexin]     Opioids - morphine analogues     Shellfish containing products     Shellfish derived Hives    Tylenol-codeine #3 [acetaminophen-codeine]     Morphine Nausea And Vomiting     History reviewed. No pertinent past medical history.  Past Surgical History:   Procedure Laterality Date    ERCP N/A 10/17/2022    Procedure: ERCP (ENDOSCOPIC RETROGRADE CHOLANGIOPANCREATOGRAPHY);  Surgeon: Tawanda Freeman MD;  Location: [address];  Service: Endoscopy;  Laterality: N/A;     No family history on file.  Social History     Tobacco Use    Smoking status: " Every Day     Types: Cigarettes   Substance Use Topics    Alcohol use: Not Currently    Drug use: Never     Review of Systems   Constitutional:  Positive for fatigue. Negative for chills and fever.   HENT:  Negative for sore throat.    Eyes:  Positive for photophobia and visual disturbance. Negative for pain and redness.   Respiratory:  Negative for shortness of breath.    Cardiovascular:  Positive for chest pain. Negative for palpitations.   Gastrointestinal:  Negative for abdominal pain, nausea and vomiting.   Genitourinary:  Negative for dysuria.   Musculoskeletal:  Negative for back pain.   Skin:  Negative for rash.   Neurological:  Positive for seizures, syncope and headaches. Negative for weakness.   Hematological:  Does not bruise/bleed easily.   All other systems reviewed and are negative.      Physical Exam     Initial Vitals [09/20/24 1223]   BP Pulse Resp Temp SpO2   (!) 127/98 (!) 112 18 98 °F (36.7 °C) 99 %      MAP       --         Physical Exam    Nursing note and vitals reviewed.  Constitutional: She appears well-developed and well-nourished.   Patient has sunglasses on   HENT:   Head: Normocephalic and atraumatic.   Mouth/Throat: No oropharyngeal exudate.   Eyes: Conjunctivae and EOM are normal. Pupils are equal, round, and reactive to light.   Extraocular muscles are intact, no obvious visual field cut   Neck: Neck supple. No tracheal deviation present.   Normal range of motion.  Cardiovascular:  Normal rate, regular rhythm, normal heart sounds and intact distal pulses.           No murmur heard.  Pulmonary/Chest: Breath sounds normal. No stridor. No respiratory distress. She has no wheezes. She has no rales.   Abdominal: Abdomen is soft. She exhibits no distension. There is no abdominal tenderness. There is no rebound.   Musculoskeletal:         General: No tenderness or edema. Normal range of motion.      Cervical back: Normal range of motion and neck supple.     Neurological: She is alert and  oriented to person, place, and time. She has normal strength. No cranial nerve deficit or sensory deficit.   Speech is normal.  No facial droop.  5/5 strength in upper and lower extremities bilaterally.  Normal finger-to-nose.  No pronator drift.   Skin: Skin is warm and dry. Capillary refill takes less than 2 seconds. No rash noted. No erythema. No pallor.   Psychiatric: She has a normal mood and affect. Thought content normal.         ED Course   Procedures  Labs Reviewed   COMPREHENSIVE METABOLIC PANEL - Abnormal       Result Value    Sodium 138      Potassium 4.2      Chloride 104      CO2 26      Glucose 108      Blood Urea Nitrogen 15      Creatinine 1.09      Calcium 9.8      Protein Total 7.6      Albumin 4.8      Globulin 2.8      Albumin/Globulin Ratio 1.7      Bilirubin Total 0.2      ALP 47 (*)     ALT 19      AST 28      eGFR 66      Anion Gap 8.0      BUN/Creatinine Ratio 14     DRUG SCREEN, URINE (BEAKER) - Abnormal    Amphetamines, Urine Negative      Barbiturates, Urine Negative      Benzodiazepine, Urine Negative      Cannabinoids, Urine Positive (*)     Cocaine, Urine Negative      Opiates, Urine Negative      Phencyclidine, Urine Negative      Methadone, Urine Negative      pH, Urine 6.0      Narrative:     Cut off concentrations:    Amphetamines - 1000 ng/ml  Barbiturates - 200 ng/ml  Benzodiazepine - 200 ng/ml  Cannabinoids (THC) - 50 ng/ml  Cocaine - 300 ng/ml  Fentanyl - 1.0 ng/ml  MDMA - 500 ng/ml  Opiates - 300 ng/ml   Phencyclidine (PCP) - 25 ng/ml  Methadone - 300 ng/ml      False negatives may result form substances such as bleach added to urine.  False positives may result for the presence of a substance with similar chemical structure to the drug or its metabolite.    This test provides only a PRELIMINARY analytical test result. A more specific alternate chemical method must be used in order to obtain a confirmed analytical result. Gas chromatography/mass spectrometry (GC/MS) is the  preferred confirmatory method. Other chemical confirmation methods are available. Clinical consideration and professional judgement should be applied to any drug of abuse test result, particularly when preliminary positive results are used.    Positive results will be confirmed only at the physicians request. Unconfirmed screening results are to be used only for medical purposes (treatment).          NT-PRO NATRIURETIC PEPTIDE - Abnormal    ProBNP 381.0 (*)    CBC WITH DIFFERENTIAL - Abnormal    WBC 6.95      RBC 3.99 (*)     Hgb 13.5      Hct 37.7      MCV 94.5      MCH 33.8      MCHC 35.8      RDW 11.5      Platelet 264      MPV 10.0      Neut % 74.0 (*)     Lymph % 20.1      Mono % 4.9      Eos % 0.6 (*)     Basophil % 0.3      Lymph # 1.40      Neut # 5.14      Mono # 0.34      Eos # 0.04      Baso # 0.02      IG# 0.01      IG% 0.1      NRBC% 0.0     URINALYSIS, REFLEX TO URINE CULTURE - Normal    Color, UA Yellow      Appearance, UA Clear      Specific Gravity, UA <=1.005      pH, UA 6.0      Protein, UA Negative      Glucose, UA Negative      Ketones, UA Negative      Blood, UA Negative      Bilirubin, UA Negative      Urobilinogen, UA 0.2      Nitrites, UA Negative      Leukocyte Esterase, UA Negative      Narrative:      URINE STABILITY IS 2 HOURS AT ROOM TEMP OR    SIX HOURS REFRIGERATED. PERFORMING TESTING ON    SPECIMENS GREATER THAN THIS AGE MAY AFFECT THE    FOLLOWING TESTS:    PH          SPECIFIC GRAVITY           BLOOD    CLARITY     BILIRUBIN               UROBILINOGEN   PREGNANCY TEST, URINE RAPID - Normal    hCG Qualitative, Urine Negative     TROPONIN I - Normal    Troponin-I <0.012     MAGNESIUM - Normal    Magnesium Level 2.00     PROTIME-INR - Normal    PT 9.9      INR 1.0      Narrative:     Protimes are used to monitor anticoagulant agents such as warfarin. PT INR values are based on the current patient normal mean and the JESSICA value for the specific instrument reagent used.  **Routine  theraputic target values for the INR are 2.0-3.0**   CBC W/ AUTO DIFFERENTIAL    Narrative:     The following orders were created for panel order CBC auto differential.  Procedure                               Abnormality         Status                     ---------                               -----------         ------                     CBC with Differential[[id number]]       Abnormal            Final result                 Please view results for these tests on the individual orders.          Imaging Results              CTA Head and Neck (xpd) (Final result)  Result time 09/20/24 15:55:33   Procedure changed from CTA Brain     Final result by Deion Pepe MD (09/20/24 15:55:33)                   Impression:      1. No significant cervical carotid or intracranial stenosis.  2. No intracranial aneurysm or significant stenosis.      Electronically signed by: Deion Pepe MD  Date:    09/20/2024  Time:    15:55               Narrative:    EXAMINATION:  CTA HEAD AND NECK (XPD)    CLINICAL HISTORY:  Subarachnoid hemorrhage (SAH) suspected;    TECHNIQUE:  Non contrast low dose axial images were obtained through the head. CT angiogram was performed from the level of the alexis to the top of the head following the administration of IV contrast.  Sagittal and coronal reconstructions and maximum intensity projection reconstructions were performed. Arterial stenosis percentages are based on NASCET measurement criteria.   Automated exposure control software was utilized to limit radiation exposure to the patient.    Total DLP for this study was 110 mgy-cm.    COMPARISON:  No prior pertinent study is currently available.    FINDINGS:  AORTA:    There is a normal 3 vessel arch.    CTA NECK:    There is no aneurysm or significant carotid stenosis.   Normal blood flow is documented both vertebral arteries.  Calcified plaques in the carotid bulbs and internal carotid arteries cause no significant stenosis.    CTA  HEAD:    There is no aneurysm or significant intracranial stenosis.  A left posterior communicating artery is visualized and appears normal.  The intracranial segments of the internal carotid and vertebral arteries are within normal limits.  The basilar artery is within normal limits.    NONVASCULAR:    No acute finding.                                       CT Head Without Contrast (Final result)  Result time 09/20/24 14:53:23      Final result by J Luis Cuellar MD (09/20/24 14:53:23)                   Impression:        1. No acute intracranial abnormality.    2.  Residua of sphenoid sinus disease.    n/a    Category: n/a    The following dose reduction techniques are used for all CT at [facility]:    1.   Automated exposure control.    2.   Adjustment of the mA and/or kV according to patient size.    3.   Use of iterative reconstruction technique.      Electronically signed by: J Luis Cuellar  Date:    09/20/2024  Time:    14:53               Narrative:    EXAMINATION:  CT HEAD WITHOUT CONTRAST    CLINICAL HISTORY:  Headache, new or worsening, neuro deficit (Age 19-49y);Headache, sudden, severe;    TECHNIQUE:  Low dose axial images were obtained through the head.  Coronal and sagittal reformations were also performed. Contrast was not administered.    COMPARISON:  None.    FINDINGS:  Multiplanar imaging through the head/brain was performed.Residual of sphenoid sinus disease is present.  I see no intraparynchymal masses, hemorhagic lesions, or dominant wedge shaped infarcts. I see no extraxial masses or abnormal fluid collections.                                       X-Ray Chest 1 View (Final result)  Result time 09/20/24 15:56:02      Final result by Deion Pepe MD (09/20/24 15:56:02)                   Impression:      1. No dense lobar consolidation, large pleural effusion or pneumothorax.      Electronically signed by: Deion Pepe MD  Date:    09/20/2024  Time:    15:56                Narrative:    EXAMINATION:  XR CHEST 1 VIEW    CLINICAL HISTORY:  Chest pain, unspecified    FINDINGS:  There is no dense lobar consolidation, large pleural effusion or pneumothorax.  The cardiomediastinal silhouette is normal in size and configuration.                        Wet Read by Jorgito Morales DO (09/20/24 13:59:08, [facility])    No acute process                                     Medications   magnesium sulfate 2g in water 50mL IVPB (premix) (0 g Intravenous Stopped 9/20/24 1739)   lactated ringers bolus 1,000 mL (0 mLs Intravenous Stopped 9/20/24 1446)   metoclopramide injection 10 mg (10 mg Intravenous Given 9/20/24 1345)   diphenhydrAMINE injection 25 mg (25 mg Intravenous Given 9/20/24 1346)   iohexoL (OMNIPAQUE 350) injection 100 mL (75 mLs Intravenous Given 9/20/24 1421)   dexAMETHasone injection 10 mg (10 mg Intravenous Given 9/20/24 1539)   ketorolac injection 30 mg (30 mg Intravenous Given 9/20/24 1605)     Medical Decision Making  Differential includes but not limited to syncope, seizure, electrolyte abnormality, dehydration, hypoglycemia, headache, tension headache, migraine with aura, migraine with neuro logical deficit, subarachnoid hemorrhage, drug abuse, anxiety, stress,    Emergent evaluation of a 39-year-old female presents emergency department with above-mentioned complaints.  Patient emergency department with mostly concerned about headache and visual disturbance.  Patient blurry vision has resolved after migraine medication treatment.  She is feeling better headache has improved.  I did obtain a CT scan of the brain in his CTA of the head and neck to rule out any aneurysm hemorrhage etcetera.  No evidence of this is found.  Patient in addition had some chest pain yesterday was gone today.  I feel 1 troponin is sufficient.  It is negative.  Low pretest probability for ACS and EKG nonischemic.  Doubt ACS.  Plan to discharge  patient home with follow up with Neurology on an outpatient basis.  I am concerned that patient may have underlying migraine diagnosis and needs further outpatient evaluation patient also had questionable seizure but unclear at this point.  She understands it is important to follow up with Neurology in she is comfortable with this plan.  I did offer to transfer her for further evaluation but she declined she was feeling better and prefers to performed outpatient workup.    A dictation software program was used for this note.  Please expect some simple typographical  errors in this note.    I had a detailed discussion with the patient and/or guardian regarding: The historical points, exam findings, and diagnostic results supporting the discharge diagnosis, lab results, pertinent radiology results, and the need for outpatient follow-up, for definitive care with a family practitioner and to return to the emergency department if symptoms worsen or persist or if there are any questions or concerns that arise at home. All questions have been answered in detail. Strict return to Emergency Department precautions have been provided.       Amount and/or Complexity of Data Reviewed  External Data Reviewed: labs and notes.  Labs: ordered. Decision-making details documented in ED Course.  Radiology: ordered and independent interpretation performed.  ECG/medicine tests: ordered and independent interpretation performed. Decision-making details documented in ED Course.    Risk  OTC drugs.  Prescription drug management.  Decision regarding hospitalization.      Additional MDM:     Well's Criteria Score:  -Clinical symptoms of DVT (leg swelling, pain with palpation) = 0.0  -PE is #1 diagnosis OR equally likely =            0.0  -Heart Rate >100 =   1.5  -Immobilization (= or > than 3 days) or surgery in the previous 4 weeks = 0.0  -Previous DVT/PE = 0.0  -Hemoptysis =          0.0  -Malignancy =           0.0  Well's Probability  Score =    1.5      Heart Score:    History:          Slightly suspicious.  ECG:             Normal  Age:               Less than 45 years  Risk factors: 1-2 risk factors  Troponin:       Less than or equal to normal limit  Heart Score = 1                ED Course as of 09/20/24 1708   Fri Sep 20, 2024   1330 EKG 12:17 p.m. sinus tachycardia rate of 114.  Right ventricular hypertrophy.  No ST elevation or depression.  No STEMI.  EKG interpreted independently by me. [JR]   1702 Patient reassess, says her headache is markedly improved.  Her vision changes or resolved.  I am suspicious that she could not under lying migraine diagnosis.  She needs further evaluation by Neurology on an outpatient basis [JR]      ED Course User Index  [JR] Jorgito Morales DO                           Clinical Impression:  Final diagnoses:  [R07.9] Chest pain  [R51.9] Nonintractable headache, unspecified chronicity pattern, unspecified headache type (Primary)  [H53.9] Visual disturbance          ED Disposition Condition    Discharge Stable          ED Prescriptions    None       Follow-up Information       Follow up With Specialties Details Why Contact Info    [facility] Emergency Medicine  If symptoms worsen [address] [facility] [id number] [phone]    Your primary care provider  In 3 days      Brandin Lane MD Neurology In 3 days  [address] [facility]  [address]  [facility] [address] [phone]               Jorgito Morales DO  09/20/24 1708

## 2024-09-20 NOTE — DISCHARGE INSTRUCTIONS
RETURN TO EMERGENCY DEPARTMENT WITHOUT FAIL, IF YOUR SYMPTOMS WORSEN, IF YOU GET NEW OR DIFFERENT SYMPTOMS, IF YOU ARE UNABLE TO FOLLOW UP AS DIRECTED, OR IF YOU HAVE ANY CONCERNS OR WORRIES.    You need to follow up with Neurology on an outpatient basis for further evaluation of your symptoms.

## 2024-09-22 LAB
OHS QRS DURATION: 74 MS
OHS QTC CALCULATION: 432 MS

## 2024-12-04 ENCOUNTER — HOSPITAL ENCOUNTER (EMERGENCY)
Facility: HOSPITAL | Age: 39
Discharge: HOME | End: 2024-12-04
Attending: FAMILY MEDICINE
Payer: MEDICAID

## 2024-12-04 VITALS
TEMPERATURE: 98 F | RESPIRATION RATE: 18 BRPM | WEIGHT: 105 LBS | BODY MASS INDEX: 21.17 KG/M2 | OXYGEN SATURATION: 100 % | SYSTOLIC BLOOD PRESSURE: 111 MMHG | HEART RATE: 78 BPM | DIASTOLIC BLOOD PRESSURE: 70 MMHG | HEIGHT: 59 IN

## 2024-12-04 DIAGNOSIS — B34.9 VIRAL SYNDROME: Primary | ICD-10-CM

## 2024-12-04 LAB
ALBUMIN SERPL-MCNC: 4.7 G/DL (ref 3.4–5)
ALBUMIN/GLOB SERPL: 1.7 RATIO
ALP SERPL-CCNC: 56 UNIT/L (ref 50–144)
ALT SERPL-CCNC: 19 UNIT/L (ref 1–45)
ANION GAP SERPL CALC-SCNC: 11 MEQ/L (ref 2–13)
AST SERPL-CCNC: 29 UNIT/L (ref 14–36)
BASOPHILS # BLD AUTO: 0.02 X10(3)/MCL (ref 0.01–0.08)
BASOPHILS NFR BLD AUTO: 0.2 % (ref 0.1–1.2)
BILIRUB SERPL-MCNC: 0.5 MG/DL (ref 0–1)
BUN SERPL-MCNC: 9 MG/DL (ref 7–20)
CALCIUM SERPL-MCNC: 9.8 MG/DL (ref 8.4–10.2)
CHLORIDE SERPL-SCNC: 106 MMOL/L (ref 98–110)
CO2 SERPL-SCNC: 22 MMOL/L (ref 21–32)
CREAT SERPL-MCNC: 0.94 MG/DL (ref 0.66–1.25)
CREAT/UREA NIT SERPL: 10 (ref 12–20)
EOSINOPHIL # BLD AUTO: 0.08 X10(3)/MCL (ref 0.04–0.36)
EOSINOPHIL NFR BLD AUTO: 0.9 % (ref 0.7–7)
ERYTHROCYTE [DISTWIDTH] IN BLOOD BY AUTOMATED COUNT: 11.3 % (ref 11–14.5)
GFR SERPLBLD CREATININE-BSD FMLA CKD-EPI: 79 ML/MIN/1.73/M2
GLOBULIN SER-MCNC: 2.8 GM/DL (ref 2–3.9)
GLUCOSE SERPL-MCNC: 106 MG/DL (ref 70–115)
HCT VFR BLD AUTO: 43 % (ref 36–48)
HGB BLD-MCNC: 15.3 G/DL (ref 11.8–16)
IMM GRANULOCYTES # BLD AUTO: 0.03 X10(3)/MCL (ref 0–0.03)
IMM GRANULOCYTES NFR BLD AUTO: 0.3 % (ref 0–0.5)
INFLUENZA A (OHS): NEGATIVE
INFLUENZA B (OHS): NEGATIVE
LYMPHOCYTES # BLD AUTO: 2.36 X10(3)/MCL (ref 1.16–3.74)
LYMPHOCYTES NFR BLD AUTO: 25.8 % (ref 20–55)
MCH RBC QN AUTO: 34.1 PG (ref 27–34)
MCHC RBC AUTO-ENTMCNC: 35.6 G/DL (ref 31–37)
MCV RBC AUTO: 95.8 FL (ref 79–99)
MONOCYTES # BLD AUTO: 0.41 X10(3)/MCL (ref 0.24–0.36)
MONOCYTES NFR BLD AUTO: 4.5 % (ref 4.7–12.5)
NEUTROPHILS # BLD AUTO: 6.23 X10(3)/MCL (ref 1.56–6.13)
NEUTROPHILS NFR BLD AUTO: 68.3 % (ref 37–73)
NRBC BLD AUTO-RTO: 0 %
PLATELET # BLD AUTO: 240 X10(3)/MCL (ref 140–371)
PMV BLD AUTO: 9.7 FL (ref 9.4–12.4)
POTASSIUM SERPL-SCNC: 4.4 MMOL/L (ref 3.5–5.1)
PROT SERPL-MCNC: 7.5 GM/DL (ref 6.3–8.2)
RBC # BLD AUTO: 4.49 X10(6)/MCL (ref 4–5.1)
RSV ANTIGEN (OHS): NEGATIVE
SARS-COV-2 RDRP RESP QL NAA+PROBE: NEGATIVE
SODIUM SERPL-SCNC: 139 MMOL/L (ref 136–145)
WBC # BLD AUTO: 9.13 X10(3)/MCL (ref 4–11.5)

## 2024-12-04 PROCEDURE — 85025 COMPLETE CBC W/AUTO DIFF WBC: CPT | Performed by: NURSE PRACTITIONER

## 2024-12-04 PROCEDURE — 96372 THER/PROPH/DIAG INJ SC/IM: CPT | Performed by: NURSE PRACTITIONER

## 2024-12-04 PROCEDURE — 87400 INFLUENZA A/B EACH AG IA: CPT | Performed by: NURSE PRACTITIONER

## 2024-12-04 PROCEDURE — 99284 EMERGENCY DEPT VISIT MOD MDM: CPT | Mod: 25

## 2024-12-04 PROCEDURE — 80053 COMPREHEN METABOLIC PANEL: CPT | Performed by: NURSE PRACTITIONER

## 2024-12-04 PROCEDURE — U0002 COVID-19 LAB TEST NON-CDC: HCPCS | Performed by: NURSE PRACTITIONER

## 2024-12-04 PROCEDURE — 87807 RSV ASSAY W/OPTIC: CPT | Performed by: NURSE PRACTITIONER

## 2024-12-04 PROCEDURE — 63600175 PHARM REV CODE 636 W HCPCS: Performed by: NURSE PRACTITIONER

## 2024-12-04 RX ORDER — METHYLPREDNISOLONE 4 MG/1
TABLET ORAL
Qty: 21 EACH | Refills: 0 | Status: SHIPPED | OUTPATIENT
Start: 2024-12-04 | End: 2024-12-25

## 2024-12-04 RX ORDER — DEXAMETHASONE SODIUM PHOSPHATE 4 MG/ML
8 INJECTION, SOLUTION INTRA-ARTICULAR; INTRALESIONAL; INTRAMUSCULAR; INTRAVENOUS; SOFT TISSUE
Status: COMPLETED | OUTPATIENT
Start: 2024-12-04 | End: 2024-12-04

## 2024-12-04 RX ADMIN — DEXAMETHASONE SODIUM PHOSPHATE 8 MG: 4 INJECTION, SOLUTION INTRA-ARTICULAR; INTRALESIONAL; INTRAMUSCULAR; INTRAVENOUS; SOFT TISSUE at 01:12

## 2024-12-04 NOTE — ED PROVIDER NOTES
"Encounter Date: 12/4/2024       History     Chief Complaint   Patient presents with    Cough    Fever    Nasal Congestion     Pt presented to ED per POV with c/o cough, nasal congestion, fever and chest congestion onset one week ago. Pt stated "thanksgiving brought me the sniffles".      39 year old female presents with cough and chest congestion, states she feels like a "ton of bricks" on her chest with fever of 102 this morning.     The history is provided by the patient. No  was used.     Review of patient's allergies indicates:   Allergen Reactions    Keflex [cephalexin]     Opioids - morphine analogues     Shellfish containing products     Shellfish derived Hives    Tylenol-codeine #3 [acetaminophen-codeine]     Morphine Nausea And Vomiting     Past Medical History:   Diagnosis Date    Diverticulitis     GERD (gastroesophageal reflux disease)      Past Surgical History:   Procedure Laterality Date    BREAST SURGERY      CHOLECYSTECTOMY      ERCP N/A 10/17/2022    Procedure: ERCP (ENDOSCOPIC RETROGRADE CHOLANGIOPANCREATOGRAPHY);  Surgeon: Tawanda Freeman MD;  Location: [address];  Service: Endoscopy;  Laterality: N/A;    TUBAL LIGATION       No family history on file.  Social History     Tobacco Use    Smoking status: Every Day     Types: Cigarettes   Substance Use Topics    Alcohol use: Not Currently    Drug use: Never     Review of Systems   Constitutional:  Positive for fever.   Respiratory:  Positive for cough and chest tightness.    All other systems reviewed and are negative.      Physical Exam     Initial Vitals [12/04/24 1204]   BP Pulse Resp Temp SpO2   (!) 157/80 84 18 98 °F (36.7 °C) 100 %      MAP       --         Physical Exam    Nursing note and vitals reviewed.  Constitutional: She appears well-developed and well-nourished.   HENT:   Head: Normocephalic and atraumatic.   Right Ear: Hearing, tympanic membrane, external ear and ear canal normal.   Left Ear: Hearing, " tympanic membrane, external ear and ear canal normal. Mouth/Throat: Uvula is midline, oropharynx is clear and moist and mucous membranes are normal.   Eyes: Conjunctivae and EOM are normal. Pupils are equal, round, and reactive to light.   Neck: Neck supple.   Normal range of motion.  Cardiovascular:  Normal rate, regular rhythm, normal heart sounds and intact distal pulses.           Pulmonary/Chest: Effort normal and breath sounds normal.   Dry hacking cough is noted   Abdominal: Abdomen is soft. Bowel sounds are normal.   Musculoskeletal:         General: Normal range of motion.      Cervical back: Normal range of motion and neck supple.     Neurological: She is alert and oriented to person, place, and time. She has normal strength.   Skin: Skin is warm and dry. Capillary refill takes less than 2 seconds.   Psychiatric: She has a normal mood and affect. Her behavior is normal. Judgment and thought content normal.         ED Course   Procedures  Labs Reviewed   COMPREHENSIVE METABOLIC PANEL - Abnormal       Result Value    Sodium 139      Potassium 4.4      Chloride 106      CO2 22      Glucose 106      Blood Urea Nitrogen 9      Creatinine 0.94      Calcium 9.8      Protein Total 7.5      Albumin 4.7      Globulin 2.8      Albumin/Globulin Ratio 1.7      Bilirubin Total 0.5      ALP 56      ALT 19      AST 29      eGFR 79      Anion Gap 11.0      BUN/Creatinine Ratio 10 (*)    CBC WITH DIFFERENTIAL - Abnormal    WBC 9.13      RBC 4.49      Hgb 15.3      Hct 43.0      MCV 95.8      MCH 34.1 (*)     MCHC 35.6      RDW 11.3      Platelet 240      MPV 9.7      Neut % 68.3      Lymph % 25.8      Mono % 4.5 (*)     Eos % 0.9      Basophil % 0.2      Lymph # 2.36      Neut # 6.23 (*)     Mono # 0.41 (*)     Eos # 0.08      Baso # 0.02      IG# 0.03      IG% 0.3      NRBC% 0.0     RAPID INFLUENZA A/B - Normal    Influenza A Negative      Influenza B Negative     SARS-COV-2 RNA AMPLIFICATION, QUAL - Normal    SARS COV-2  Molecular Negative      Narrative:     The IDNOW COVID-19 assay is a rapid molecular in vitro diagnostic test utilizing an isothermal nucleic acid amplification technology intended for the qualitative detection of nucleic acid from the SARS-CoV-2 viral RNA in direct nasal, nasopharyngeal or throat swabs from individuals who are suspected of COVID-19 by their healthcare provider.   RAPID RSV - Normal    RSV Negative     CBC W/ AUTO DIFFERENTIAL    Narrative:     The following orders were created for panel order CBC auto differential.  Procedure                               Abnormality         Status                     ---------                               -----------         ------                     CBC with Differential[[id number]]       Abnormal            Final result                 Please view results for these tests on the individual orders.          Imaging Results              CT Chest Without Contrast (Final result)  Result time 12/04/24 12:55:22      Final result by Peterson Reina III, MD (12/04/24 12:55:22)                   Impression:      1. No clinically significant abnormalities are noted.  See above comments.      Electronically signed by: Peterson Reina  Date:    12/04/2024  Time:    12:55               Narrative:    EXAMINATION:  STUDY:CT CHEST WITHOUT CONTRAST    CLINICAL HISTORY AND TECHNIQUE:  Persistent cough    This patient has had 2 CT and nuclear medicine scans performed within the last 12 months.    The following DOSE REDUCTION TECHNIQUES are used for all CT scans at [facility]:    1. Automated exposure control.  2. Adjustment of the mA and/or kv according to patient size.  3. Use of iterative reconstruction technique.    COMPARISON:  Chest x-ray dated 09/20/2024    FINDINGS:  Lungs: I see no worrisome parenchymal masses/nodules or focal consolidation.    Airways: No clinically significant abnormalities noted.    Mediastinum and hilar regions:See no worrisome hilar  mediastinal masses or matted lymphadenopathy.    Vascular structures: Minimal atherosclerotic plaquing is noted within the coronary arteries.    Musculoskeletal structures: No clinically significant abnormalities noted.    Miscellaneous: Intact, bilateral breast implants are present.                                       Medications   dexAMETHasone injection 8 mg (has no administration in time range)     Medical Decision Making  Problems Addressed:  Viral syndrome: acute illness or injury    Amount and/or Complexity of Data Reviewed  Labs: ordered.  Radiology: ordered.    Risk  Prescription drug management.                                      Clinical Impression:  Final diagnoses:  [B34.9] Viral syndrome (Primary)          ED Disposition Condition    Discharge Stable          ED Prescriptions       Medication Sig Dispense Start Date End Date Auth. Provider    methylPREDNISolone (MEDROL DOSEPACK) 4 mg tablet use as directed 21 each 12/4/2024 12/25/2024 Amalia Fabian FNP          Follow-up Information       Follow up With Specialties Details Why Contact Info    [facility] Emergency Medicine In 3 days If symptoms worsen [address] [facility] [address]  [phone]             Amalia Fabina FNP  12/04/24 1300